# Patient Record
Sex: MALE | Race: WHITE
[De-identification: names, ages, dates, MRNs, and addresses within clinical notes are randomized per-mention and may not be internally consistent; named-entity substitution may affect disease eponyms.]

---

## 2018-05-23 ENCOUNTER — HOSPITAL ENCOUNTER (EMERGENCY)
Dept: HOSPITAL 11 - JP.ED | Age: 83
Discharge: HOME | End: 2018-05-23
Payer: MEDICARE

## 2018-05-23 DIAGNOSIS — Z79.899: ICD-10-CM

## 2018-05-23 DIAGNOSIS — I10: ICD-10-CM

## 2018-05-23 DIAGNOSIS — N13.2: Primary | ICD-10-CM

## 2018-05-23 NOTE — EDM.PDOC
ED HPI GENERAL MEDICAL PROBLEM





- General


Chief Complaint: Abdominal Pain


Stated Complaint: PAIN IN LEFT GROIN AREA


Time Seen by Provider: 05/23/18 19:05


Source of Information: Reports: Patient


History Limitations: Reports: No Limitations





- History of Present Illness


INITIAL COMMENTS - FREE TEXT/NARRATIVE: 





See Dr. Bhandari's History and Physical .


Patient not evaluated by this Provider


Improves with: Reports: None


Associated Symptoms: Reports: Diaphoresis, Nausea/Vomiting, Weakness


  ** Left Lower Abdomen


Pain Score (Numeric/FACES): 6





- Related Data


 Allergies











Allergy/AdvReac Type Severity Reaction Status Date / Time


 


No Known Allergies Allergy   Verified 05/23/18 19:14











Home Meds: 


 Home Meds





Levothyroxine 175 mcg PO ACBRK 05/23/18 [History]


Losartan [Cozaar] 50 mg PO DAILY 05/23/18 [History]


Metoprolol Succinate [Toprol XL] 25 mg PO DAILY 05/23/18 [History]











Past Medical History


HEENT History: Reports: Cataract, Hard of Hearing


Cardiovascular History: Reports: Hypertension


Respiratory History: Reports: Sleep Apnea


Musculoskeletal History: Reports: Back Pain, Chronic


Dermatologic History: Reports: Melanoma





- Infectious Disease History


Infectious Disease History: Reports: Chicken Pox, Measles





- Past Surgical History


HEENT Surgical History: Reports: Adenoidectomy, Cataract Surgery, Tonsillectomy

, Other (See Below)


Other HEENT Surgeries/Procedures: melanoma removed off left eye, melanoma 

removed on nose


GI Surgical History: Reports: Hernia, Inguinal


Musculoskeletal Surgical History: Reports: Knee Replacement





Social & Family History





- Family History


Family Medical History: Noncontributory





- Tobacco Use


Smoking Status *Q: Never Smoker





- Caffeine Use


Caffeine Use: Reports: Coffee





- Recreational Drug Use


Recreational Drug Use: No





ED ROS GENERAL





- Review of Systems


Review Of Systems: See Below


Constitutional: Reports: Other (See Dr. Bhandari H&P)





ED EXAM, GENERAL





- Physical Exam


Exam: See Below


Exam Limited By: Other (See Dr. Thony OLEARY)


Back Exam: Normal Inspection, Full Range of Motion, NT


Extremities: Normal Inspection, Normal Range of Motion, Non-Tender, No Pedal 

Edema, Normal Capillary Refill





Course





- Vital Signs


Last Recorded V/S: 


 Last Vital Signs











Temp  36.9 C   05/23/18 19:16


 


Pulse  98   05/23/18 19:16


 


Resp  16   05/23/18 19:16


 


BP  192/163 H  05/23/18 19:16


 


Pulse Ox  96   05/23/18 19:16














- Orders/Labs/Meds


Orders: 


 Active Orders 24 hr











 Category Date Time Status


 


 Abdomen Pelvis wo Cont [CT] Stat Exams  05/23/18 19:01 Taken











Labs: 


 Laboratory Tests











  05/23/18 05/23/18 Range/Units





  19:00 19:00 


 


WBC  8.4   (4.5-11.0)  K/uL


 


RBC  4.63   (4.30-5.90)  M/uL


 


Hgb  15.1 H   (12.0-15.0)  g/dL


 


Hct  42.8   (40.0-54.0)  %


 


MCV  92   (80-98)  fL


 


MCH  33 H   (27-31)  pg


 


MCHC  35   (32-36)  %


 


Plt Count  186   (150-400)  K/uL


 


Neut % (Auto)  72 H   (36-66)  %


 


Lymph % (Auto)  17 L   (24-44)  %


 


Mono % (Auto)  10 H   (2-6)  %


 


Eos % (Auto)  1 L   (2-4)  %


 


Baso % (Auto)  0   (0-1)  %


 


Sodium   144  (140-148)  mmol/L


 


Potassium   4.5  (3.6-5.2)  mmol/L


 


Chloride   107  (100-108)  mmol/L


 


Carbon Dioxide   28  (21-32)  mmol/L


 


Anion Gap   9.4  (5.0-14.0)  mmol/L


 


BUN   27 H  (7-18)  mg/dL


 


Creatinine   1.4 H  (0.8-1.3)  mg/dL


 


Est Cr Clr Drug Dosing   54.84  mL/min


 


Estimated GFR (MDRD)   49 L  (>60)  


 


Glucose   121 H  ()  mg/dL


 


Calcium   8.8  (8.5-10.1)  mg/dL


 


Total Bilirubin   0.5  (0.2-1.0)  mg/dL


 


AST   27  (15-37)  U/L


 


ALT   39  (12-78)  U/L


 


Alkaline Phosphatase   60  ()  U/L


 


Total Protein   7.6  (6.4-8.2)  g/dL


 


Albumin   3.9  (3.4-5.0)  g/dL


 


Globulin   3.7 H  (2.3-3.5)  g/dL


 


Albumin/Globulin Ratio   1.1 L  (1.2-2.2)  














Departure





- Departure


Time of Disposition: 20:59


Disposition: Home, Self-Care 01


Condition: Good


Clinical Impression: 


 Kidney stones








- Discharge Information


Instructions:  Kidney Stones, Easy-to-Read


Referrals: 


Sae Bhandari Sr, MD [Primary Care Provider] - 


Forms:  ED Department Discharge


Care Plan Goals: 


Kidney Stones


-drink plenty of water


-start Flomax 0.4mg po bid in morning


-follow up with Dr. Bhandari as planned.





- Problem List & Annotations


(1) Kidney stones


SNOMED Code(s): 12917079


   Code(s): N20.0 - CALCULUS OF KIDNEY   Status: Acute   Priority: High   

Current Visit: Yes   





- Problem List Review


Problem List Initiated/Reviewed/Updated: Yes





- Assessment/Plan


Plan: 


Kidney Stones


-drink plenty of water


-start Flomax 0.4mg po bid in morning


-follow up with Dr. Bhandari as planned.

## 2018-05-23 NOTE — PCM.HP
H&P History of Present Illness





- General


Date of Service: 05/23/18


Source of Information: Patient


History Limitations: Reports: No Limitations





- History of Present Illness


Initial Comments - Free Text/Narative: 





Sudden onset of abd. pain on the right side and never had it before.  Pain is 

coliky in nature.


Improves with: Reports: None


Associated Symptoms: Reports: Diaphoresis, Nausea/Vomiting, Weakness


  ** Left Lower Abdomen


Pain Score (Numeric/FACES): 6





- Related Data


Allergies/Adverse Reactions: 


 Allergies











Allergy/AdvReac Type Severity Reaction Status Date / Time


 


No Known Allergies Allergy   Verified 05/23/18 19:14











Home Medications: 


 Home Meds





Levothyroxine 175 mcg PO ACBRK 05/23/18 [History]


Losartan [Cozaar] 50 mg PO DAILY 05/23/18 [History]


Metoprolol Succinate [Toprol XL] 25 mg PO DAILY 05/23/18 [History]











Past Medical History


HEENT History: Reports: Cataract, Hard of Hearing


Cardiovascular History: Reports: Hypertension


Respiratory History: Reports: Sleep Apnea


Musculoskeletal History: Reports: Back Pain, Chronic


Dermatologic History: Reports: Melanoma





- Infectious Disease History


Infectious Disease History: Reports: Chicken Pox, Measles





- Past Surgical History


HEENT Surgical History: Reports: Adenoidectomy, Cataract Surgery, Tonsillectomy

, Other (See Below)


Other HEENT Surgeries/Procedures: melanoma removed off left eye, melanoma 

removed on nose


GI Surgical History: Reports: Hernia, Inguinal


Musculoskeletal Surgical History: Reports: Knee Replacement





Social & Family History





- Family History


Family Medical History: Noncontributory





- Tobacco Use


Smoking Status *Q: Never Smoker





- Caffeine Use


Caffeine Use: Reports: Coffee





- Recreational Drug Use


Recreational Drug Use: No





H&P Review of Systems





- Review of Systems:


Review Of Systems: See Below


General: Reports: Weakness


HEENT: Reports: No Symptoms


Pulmonary: Reports: No Symptoms


Cardiovascular: Reports: No Symptoms


Gastrointestinal: Reports: No Symptoms


Genitourinary: Reports: No Symptoms


Musculoskeletal: Reports: No Symptoms


Skin: Reports: No Symptoms


Psychiatric: Reports: No Symptoms


Neurological: Reports: No Symptoms





Exam





- Exam


Exam: See Below





- Vital Signs


Vital Signs: 


 Last Vital Signs











Temp  98.5 F   05/23/18 19:16


 


Pulse  98   05/23/18 19:16


 


Resp  16   05/23/18 19:16


 


BP  192/163 H  05/23/18 19:16


 


Pulse Ox  96   05/23/18 19:16











Weight: 270 lb





- Exam


General: Alert, Oriented, 4


HEENT: PERRLA, Hearing Intact, Mucosa Moist & Pink, Nares Patent, Normal Nasal 

Septum, Posterior Pharynx Clear, Conjunctiva Clear, EOMI, EACs Clear, TMs Clear


Neck: Supple, Trachea Midline, 2


Lungs: Clear to Auscultation, Normal Respiratory Effort


Cardiovascular: Regular Rate, Regular Rhythm


Back Exam: Normal Inspection, Full Range of Motion, NT


Extremities: Normal Inspection, Normal Range of Motion, Non-Tender, No Pedal 

Edema, Normal Capillary Refill


Skin: Warm, Dry, Intact





- Patient Data


Lab Results Last 24 hrs: 


 Laboratory Results - last 24 hr











  05/23/18 05/23/18 Range/Units





  19:00 19:00 


 


WBC  8.4   (4.5-11.0)  K/uL


 


RBC  4.63   (4.30-5.90)  M/uL


 


Hgb  15.1 H   (12.0-15.0)  g/dL


 


Hct  42.8   (40.0-54.0)  %


 


MCV  92   (80-98)  fL


 


MCH  33 H   (27-31)  pg


 


MCHC  35   (32-36)  %


 


Plt Count  186   (150-400)  K/uL


 


Neut % (Auto)  72 H   (36-66)  %


 


Lymph % (Auto)  17 L   (24-44)  %


 


Mono % (Auto)  10 H   (2-6)  %


 


Eos % (Auto)  1 L   (2-4)  %


 


Baso % (Auto)  0   (0-1)  %


 


Sodium   144  (140-148)  mmol/L


 


Potassium   4.5  (3.6-5.2)  mmol/L


 


Chloride   107  (100-108)  mmol/L


 


Carbon Dioxide   28  (21-32)  mmol/L


 


Anion Gap   9.4  (5.0-14.0)  mmol/L


 


BUN   27 H  (7-18)  mg/dL


 


Creatinine   1.4 H  (0.8-1.3)  mg/dL


 


Est Cr Clr Drug Dosing   54.84  mL/min


 


Estimated GFR (MDRD)   49 L  (>60)  


 


Glucose   121 H  ()  mg/dL


 


Calcium   8.8  (8.5-10.1)  mg/dL


 


Total Bilirubin   0.5  (0.2-1.0)  mg/dL


 


AST   27  (15-37)  U/L


 


ALT   39  (12-78)  U/L


 


Alkaline Phosphatase   60  ()  U/L


 


Total Protein   7.6  (6.4-8.2)  g/dL


 


Albumin   3.9  (3.4-5.0)  g/dL


 


Globulin   3.7 H  (2.3-3.5)  g/dL


 


Albumin/Globulin Ratio   1.1 L  (1.2-2.2)  











Result Diagrams: 


 05/23/18 19:00





 05/23/18 19:00


Problem List Initiated/Reviewed/Updated: Yes


Orders Last 24hrs: 


 Active Orders 24 hr











 Category Date Time Status


 


 Abdomen Pelvis wo Cont [CT] Stat Exams  05/23/18 19:01 Taken











Assessment/Plan Comment:: 





Assessment/Plan:  Kidney stone, Left.  He is to increase his water intake.  

Start taking Flomax bid.  If the stone doesn't pass he will see a Urologist.  

Will see in the office in 5 days.  He is to strain his urine.

## 2021-07-26 ENCOUNTER — HOSPITAL ENCOUNTER (EMERGENCY)
Dept: HOSPITAL 11 - JP.ED | Age: 86
Discharge: NURSING, REHAB OR LONG-TERM CARE | End: 2021-07-26
Payer: MEDICARE

## 2021-07-26 DIAGNOSIS — I63.9: Primary | ICD-10-CM

## 2021-07-26 DIAGNOSIS — Z79.899: ICD-10-CM

## 2021-07-26 DIAGNOSIS — I10: ICD-10-CM

## 2021-07-26 NOTE — CRLCT
For Patients:  As a result of the 21st Century Cures Act, medical imaging 

exams and procedure reports are released immediately into your electronic 

medical record.  You may view this report before your referring provider.  

If you have questions, please contact your health care provider.



INDICATION:



Right-sided weakness.



TECHNIQUE:



CT of the head without contrast. Coronal and sagittal reformats. Bone and 

soft tissue algorithms.



COMPARISON:



11/07/2016 CT head



FINDINGS:



No acute intracranial hemorrhage. No evidence of acute cortical infarction. 

Stable mild encephalomalacia in the left middle frontal gyrus and right 

superior frontal gyrus consistent with remote ischemic infarcts. Stable 

chronic lacunar infarcts in the left superior cerebellum. There is similar 

prominence of the extra-axial spaces along the frontal convexities 

suggestive of underlying subdural hygromas. This measures 1.2 cm on the 

left and 0.7 cm on the right side 



No mass effect or midline shift. Mild generalized cerebral/cerebellar 

parenchymal volume loss. Mild regions of decreased attenuation within the 

periventricular and subcortical white matter of both cerebral hemispheres 

most likely reflects chronic microvascular ischemic disease and age related 

change in this patient. Vascular calcifications within the carotid siphons. 

Orbital contents are normal.  No calvarial fractures. No lytic or sclerotic 

osseous lesions within the calvarium or skull base. Scalp and other imaged 

soft tissue structures are normal. Mastoid air cells are clear. 



Findings were discussed with Dr. Rees at 5:28 p.m. 



IMPRESSION:



1. No acute intracranial abnormality.



2. Stable mild encephalomalacia in the left middle frontal gyrus and right 

superior frontal gyrus consistent with remote ischemic infarcts. Stable 

chronic lacunar infarcts in the left superior cerebellum.



3. There is similar prominence of the extra-axial spaces along the frontal 

convexities suggestive of underlying subdural hygromas.



Please note that all CT scans at this facility use dose modulation, 

iterative reconstruction, and/or weight-based dosing when appropriate to 

reduce radiation dose to as low as reasonably achievable.



Dictated by Diego Mckinney MD @ 7/26/2021 5:28:32 PM



Signed by Dr. Diego Mckinney @ Jul 26 2021  5:28PM

## 2021-07-26 NOTE — EDM.PDOC
ED HPI GENERAL MEDICAL PROBLEM





- General


Stated Complaint: MEDICAL VIA NORTH


Time Seen by Provider: 07/26/21 16:45


Source of Information: Reports: Patient


History Limitations: Reports: No Limitations





- History of Present Illness


INITIAL COMMENTS - FREE TEXT/NARRATIVE: 





85-year-old male that started developing some right-sided weakness while at a 

local store about an hour and a half ago.  He noticed something was not right 

when he reached out and grabbed a carton of milk with his right hand and was not

able to pull it out.  He then felt off balanced, weak on his right side but was 

already in a scooter because he suffers from significant vertigo.  He has no 

headache, no visual problems, just some slight dysarthria but no expressive 

aphasia.  When he got to his car he tried to get up to get into the car and was 

having difficulty bearing weight on the right side and slumped to the ground.  

EMS was called.





- Related Data


                                    Allergies











Allergy/AdvReac Type Severity Reaction Status Date / Time


 


No Known Allergies Allergy   Verified 07/26/21 16:59











Home Meds: 


                                    Home Meds





Levothyroxine 175 mcg PO ACBRK 05/23/18 [History]


Metoprolol Succinate [Toprol XL] 25 mg PO DAILY 05/23/18 [History]


Warfarin [Coumadin] 5 mg PO ASDIRECTED 07/26/21 [History]











Past Medical History


HEENT History: Reports: Cataract, Hard of Hearing


Cardiovascular History: Reports: Hypertension


Respiratory History: Reports: Sleep Apnea


Musculoskeletal History: Reports: Back Pain, Chronic


Dermatologic History: Reports: Melanoma





- Infectious Disease History


Infectious Disease History: Reports: Chicken Pox, Measles





- Past Surgical History


HEENT Surgical History: Reports: Adenoidectomy, Cataract Surgery, Tonsillectomy,

 Other (See Below)


Other HEENT Surgeries/Procedures: melanoma removed off left eye, melanoma 

removed on nose


GI Surgical History: Reports: Hernia, Inguinal


Musculoskeletal Surgical History: Reports: Knee Replacement





Social & Family History





- Family History


Family Medical History: No Pertinent Family History





- Caffeine Use


Caffeine Use: Reports: Coffee





ED ROS GENERAL





- Review of Systems


Review Of Systems: See Below


Constitutional: Denies: Fever, Chills


HEENT: Reports: Vertigo (Chronic).  Denies: Rhinitis


Respiratory: Denies: Shortness of Breath


Cardiovascular: Denies: Chest Pain


GI/Abdominal: Denies: Nausea, Vomiting


: Reports: No Symptoms


Musculoskeletal: Reports: No Symptoms


Skin: Denies: Bruising


Neurological: Reports: Dizziness.  Denies: Headache


Psychiatric: Reports: No Symptoms





ED EXAM, GENERAL





- Physical Exam


Exam: See Below


Exam Limited By: No Limitations


General Appearance: Alert, No Apparent Distress


Eye Exam: Bilateral Eye: EOMI, Normal Inspection, PERRL


Head: Atraumatic


Neck: Supple, Non-Tender


Respiratory/Chest: Lungs Clear


Cardiovascular: Irregularly Irregular.  No: Tachycardia


GI/Abdominal: Soft, Non-Tender


Extremities: No: Pedal Edema


Neurological: Sensory/Motor Deficit (Right leg and right arm are weaker compared

 to the left, very slight facial droop on the right side.  Minimal sensory loss 

on the right side compared to the left.), Other (NIH stroke scale 9).  No: Maria Esther

ttentive, Confused


Psychiatric: Normal Affect, Normal Mood


Skin Exam: Warm, Dry





Course





- Vital Signs


Last Recorded V/S: 


                                Last Vital Signs











Temp  98.1 F   07/26/21 16:56


 


Pulse  82   07/26/21 17:29


 


Resp  16   07/26/21 17:29


 


BP  156/93 H  07/26/21 17:29


 


Pulse Ox  96   07/26/21 17:29














- Orders/Labs/Meds


Orders: 


                               Active Orders 24 hr











 Category Date Time Status


 


 EKG 12 Lead [EK] Routine Ther  07/26/21 16:53 Ordered











Labs: 


                                Laboratory Tests











  07/26/21 07/26/21 07/26/21 Range/Units





  16:45 16:45 16:45 


 


WBC  6.2    (4.5-11.0)  K/uL


 


RBC  4.78    (4.30-5.90)  M/uL


 


Hgb  15.8 H    (12.0-15.0)  g/dL


 


Hct  45.0    (40.0-54.0)  %


 


MCV  94    (80-98)  fL


 


MCH  33 H    (27-31)  pg


 


MCHC  35    (32-36)  %


 


Plt Count  182    (150-400)  K/uL


 


Neut % (Auto)  62.2    (36-66)  %


 


Lymph % (Auto)  25.4    (24-44)  %


 


Mono % (Auto)  10.0 H    (2-6)  %


 


Eos % (Auto)  2.1    (2-4)  %


 


Baso % (Auto)  0.3    (0-1)  %


 


PT   33.0 H   (9.5-12.0)  sec


 


INR   3.10 H   (0.80-1.20)  


 


Sodium    140  (140-148)  mmol/L


 


Potassium    4.4  (3.6-5.2)  mmol/L


 


Chloride    106  (100-108)  mmol/L


 


Carbon Dioxide    23  (21-32)  mmol/L


 


Anion Gap    10.8  (5.0-14.0)  mmol/L


 


BUN    20 H  (7-18)  mg/dL


 


Creatinine    1.0  (0.8-1.3)  mg/dL


 


Est Cr Clr Drug Dosing    72.88  mL/min


 


Estimated GFR (MDRD)    > 60  (>60)  


 


Glucose    104  ()  mg/dL


 


Calcium    8.7  (8.5-10.1)  mg/dL


 


Total Bilirubin    0.6  (0.2-1.0)  mg/dL


 


AST    24  (15-37)  U/L


 


ALT    29  (12-78)  U/L


 


Alkaline Phosphatase    60  ()  U/L


 


Total Protein    7.2  (6.4-8.2)  g/dL


 


Albumin    3.7  (3.4-5.0)  g/dL


 


Globulin    3.5  (2.3-3.5)  g/dL


 


Albumin/Globulin Ratio    1.1 L  (1.2-2.2)  














- Re-Assessments/Exams


Free Text/Narrative Re-Assessment/Exam: 





07/26/21 17:30


INR returned 3.1, CT report is as follows








IMPRESSION:


1. No acute intracranial abnormality.


2. Stable mild encephalomalacia in the left middle frontal gyrus and right s

uperior frontal gyrus consistent with remote ischemic infarcts. Stable chronic 

lacunar infarcts in the left superior cerebellum.


3. There is similar prominence of the extra-axial spaces along the frontal 

convexities suggestive of underlying subdural hygromas.





Findings were discussed with Dr. Mccormack patient symptoms remain stable, 

unimproved but not worse.  This was discussed with interventional neurologist 

on-call for [facility] in [place].  He accepted the patient to be transferred 

urgently and air care was called and are on their way.


07/26/21 17:34


Remaining labs are very reassuring, white count is normal, hemoglobin 15.8.  

Extended chemistry panel was normal other than a BUN of 20, again INR is 3.1.





Departure





- Departure


Time of Disposition: 17:59


Disposition: DC/Tfer to Acute Hospital 02


Clinical Impression: 


Cerebrovascular accident (CVA)


Qualifiers:


 CVA mechanism: unspecified Qualified Code(s): I63.9 - Cerebral infarction, 

unspecified








- Discharge Information


Referrals: 


PCP,None [Primary Care Provider] - 


Forms:  ED Department Discharge


Care Plan Goals: 


Patient transferred urgently to [facility] for interventional neurologist 

eval and treatment, accepted by Dr. Yi. Stable and unchanged on discharge





- My Orders


Last 24 Hours: 


My Active Orders





07/26/21 16:53


EKG 12 Lead [EK] Routine 














- Assessment/Plan


Last 24 Hours: 


My Active Orders





07/26/21 16:53


EKG 12 Lead [EK] Routine

## 2021-10-27 ENCOUNTER — HOSPITAL ENCOUNTER (EMERGENCY)
Dept: HOSPITAL 11 - JP.ED | Age: 86
Discharge: HOME | End: 2021-10-27
Payer: MEDICARE

## 2021-10-27 DIAGNOSIS — Z79.899: ICD-10-CM

## 2021-10-27 DIAGNOSIS — I63.9: Primary | ICD-10-CM

## 2021-10-27 DIAGNOSIS — Z86.73: ICD-10-CM

## 2021-10-27 DIAGNOSIS — I10: ICD-10-CM

## 2021-10-27 PROCEDURE — 84484 ASSAY OF TROPONIN QUANT: CPT

## 2021-10-27 PROCEDURE — 85730 THROMBOPLASTIN TIME PARTIAL: CPT

## 2021-10-27 PROCEDURE — 71045 X-RAY EXAM CHEST 1 VIEW: CPT

## 2021-10-27 PROCEDURE — 80053 COMPREHEN METABOLIC PANEL: CPT

## 2021-10-27 PROCEDURE — 70551 MRI BRAIN STEM W/O DYE: CPT

## 2021-10-27 PROCEDURE — 99284 EMERGENCY DEPT VISIT MOD MDM: CPT

## 2021-10-27 PROCEDURE — 85610 PROTHROMBIN TIME: CPT

## 2021-10-27 PROCEDURE — 36415 COLL VENOUS BLD VENIPUNCTURE: CPT

## 2021-10-27 PROCEDURE — 70450 CT HEAD/BRAIN W/O DYE: CPT

## 2021-10-27 PROCEDURE — 82550 ASSAY OF CK (CPK): CPT

## 2021-10-27 PROCEDURE — 70498 CT ANGIOGRAPHY NECK: CPT

## 2021-10-27 PROCEDURE — 85025 COMPLETE CBC W/AUTO DIFF WBC: CPT

## 2021-10-27 PROCEDURE — 93005 ELECTROCARDIOGRAM TRACING: CPT

## 2021-10-27 PROCEDURE — 70496 CT ANGIOGRAPHY HEAD: CPT

## 2021-10-27 RX ADMIN — Medication PRN ML: at 14:31

## 2021-10-27 RX ADMIN — Medication PRN ML: at 09:38

## 2021-10-27 NOTE — MR
Brain wo Cont

 

CLINICAL HISTORY:  History of CVA, new left sided weakness 

 

COMPARISON: Current CT brain

 

TECHNIQUE: Multiple axial, sagittal, and coronal images were obtained on a 1.5 T

magnet with multiweighted sequences, FLAIR, and diffusion imaging without

contrast. 

 

FINDINGS: There is no focal mass lesion. There is no hemmorhage or extraaxial

collection. There is cyst focus of restricted diffusion in the left basal

ganglia involving the posterior limb of the internal capsule. This also shows

increased signal on FLAIR images. There is a second ill-defined small focus of

restricted diffusion in the right the parietal subcortical region. This is also

bright on FLAIR images. This could represent some chronic ischemic microvascular

change. Subacute ischemic infarct is not excluded. There is moderate scattered

T2 hyperintensity in the subcortical and periventricular white matter in both

cerebral hemispheres. There is a focus of encephalomalacia in the left

cerebellar hemisphere related to old small ischemic infarct. The basal cisterns

and sulci over the convexities are prominent. The ventricles are mildly

prominent.  

 

IMPRESSION: Small focus of restricted diffusion and increased T2 signal in the

posterior limb of the left internal capsule is suspicious for subacute ischemic

infarct.

 

Was well defined focus of restricted diffusion and increased T2 signal is seen

in the right parietal subcortical white matter near the gray-white matter

junction. This may represent subacute ischemic infarct or possibly some chronic

ischemic microvascular change

 

Moderate atrophy with prominent CSF spaces over the frontal lobes. 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

## 2021-10-27 NOTE — CR
CHEST: Portable 10/27/2021 at 9:58 AM

 

CLINICAL HISTORY:Fall

 

COMPARISON:None

 

FINDINGS:  The heart size, pulmonary vascularity and hilar structures are

normal. No infiltrate effusion or pneumothorax is seen. There are

atherosclerotic changes in the aorta.

 

IMPRESSION: No acute cardiopulmonary process.

## 2021-10-27 NOTE — CT
Ang Head

 

CLINICAL HISTORY: CVA.

 

COMPARISON: None

 

TECHNIQUE: Multiple volume rendered and MIP 3D reconstructions were generated

from source images obtained on a spiral scanner before and after intravenous

iodinated contrast enhancement Auto dosage reduction and iterative

reconstruction techniques employed.

 

FINDINGS:

 

Internal carotid arteries: Show moderate hard plaque within the carotid siphons.

There are some areas of mild-to-moderate stenosis bilaterally. Calcifications

limit evaluation

 

Anterior cerebral arteries: Have a normal course and contour. The anterior

communicating artery is patent

 

Middle cerebral arteries: Normal course and contour

 

Posterior cerebral arteries: There is persistent fetal circulation on the left

with left posterior cerebral artery being serviced by the left carotid

distribution 

 

Vertebral/basilar arteries: The right vertebral artery is dominant. There is

some mild smooth narrowing of the distal basilar artery

 

IMPRESSION: Hard plaque in the carotid siphons bilaterally with some areas of

mild-to-moderate stenosis. Evaluation is limited by the hard plaque.

 

 

Persistent fetal circulation of the left

## 2021-10-27 NOTE — EDM.PDOC
ED HPI GENERAL MEDICAL PROBLEM





- General


Chief Complaint: Neuro Symptoms/Deficits


Stated Complaint: NO CONTROL OF LT SIDE OF FACE


Time Seen by Provider: 10/27/21 09:12


Source of Information: Reports: Patient, Old Records, RN Notes Reviewed


History Limitations: Reports: No Limitations





- History of Present Illness


INITIAL COMMENTS - FREE TEXT/NARRATIVE: 





86-year-old gentleman presents emergency department today complaint of left-

sided weakness, and left-sided facial droop.  He has a known history of 

cerebrovascular accident is on Coumadin cerebral accident accident was on the 

left side with a right-sided deficit mainly weakness.  He is about 6 months out 

from his prior accident.  For this particular event he states he has had left-

sided facial droop for the last couple of days.  He did fall at home yesterday 

afternoon he was outside he spent the night outside because he was too weak to 

get up estimate outside 5 to 6 hours.  He is complaining of left-sided weakness 

and has a new left-sided facial droop.





- Related Data


                                    Allergies











Allergy/AdvReac Type Severity Reaction Status Date / Time


 


No Known Allergies Allergy   Verified 10/27/21 09:00











Home Meds: 


                                    Home Meds





Levothyroxine 175 mcg PO ACBRK 05/23/18 [History]


Metoprolol Succinate [Toprol XL] 25 mg PO DAILY 05/23/18 [History]


Warfarin [Coumadin] 5 mg PO ASDIRECTED 07/26/21 [History]


atorvaSTATin [Lipitor] 40 mg PO DAILY 10/27/21 [History]











Past Medical History


HEENT History: Reports: Cataract, Hard of Hearing


Cardiovascular History: Reports: Hypertension


Respiratory History: Reports: Sleep Apnea


Musculoskeletal History: Reports: Back Pain, Chronic


Neurological History: Reports: CVA


Hematologic History: Reports: Anticoagulation Therapy


Dermatologic History: Reports: Melanoma





- Infectious Disease History


Infectious Disease History: Reports: Chicken Pox, Measles





- Past Surgical History


HEENT Surgical History: Reports: Adenoidectomy, Cataract Surgery, Tonsillectomy,

 Other (See Below)


Other HEENT Surgeries/Procedures: melanoma removed off left eye, melanoma 

removed on nose


GI Surgical History: Reports: Hernia, Inguinal


Neurological Surgical History: Reports: Other (See Below)


Other Neurological Surgeries/Procedures: back surgery


Musculoskeletal Surgical History: Reports: Knee Replacement





Social & Family History





- Family History


Family Medical History: No Pertinent Family History





- Tobacco Use


Tobacco Use Status *Q: Never Tobacco User





- Caffeine Use


Caffeine Use: Reports: Coffee





- Recreational Drug Use


Recreational Drug Use: No





ED ROS GENERAL





- Review of Systems


Review Of Systems: See Below


Constitutional: Reports: Weakness


HEENT: Reports: No Symptoms


Respiratory: Reports: No Symptoms


Cardiovascular: Reports: No Symptoms


GI/Abdominal: Reports: No Symptoms


Neurological: Reports: Trouble Speaking, Difficulty Walking, Weakness.  Denies: 

Headache





ED EXAM, NEURO





- Physical Exam


Exam: See Below


Text/Narrative:: 





Cranial nerves II test with pupillary light reflex 4 mm to 2 mm bilaterally, CN 

III test pupillary constriction, lid elevation and eye abduction bilaterally, CN

 IV downward movement of eyes bilaterally, CN V good jaw movement, CN VI lateral

 deviation of the eyes bilaterally to finger movement, CN VII asymmetrical smile

 with left-sided facial droop shows teeth with difficulty, CN VIII pass finger 

rub to ears bilaterally, CN IX adequate voice and tone, CN X adequate voice and 

tone no difficulty swallowing, CN XI can shrug shoulders without difficulty, CN 

XII can stick tongue out without difficulty, power is similar in upper 

extremities however when asked to raise arms to test pronator drift the left arm

 does not raise as high patient states this is difficult for him because his arm

 feels so weak he also has difficulty raising his left leg, NIH 8


Exam Limited By: No Limitations


General Appearance: Alert, No Apparent Distress


Eye Exam: Bilateral Eye: EOMI, Normal Inspection, PERRL


Throat/Mouth: Normal Inspection, Normal Lips, Normal Teeth, Normal Gums, Normal 

Oropharynx, Normal Voice, Other (Left-sided facial droop)


Head Exam: Atraumatic, Normocephalic


Neck: Normal Inspection, Supple, Non-Tender, Full Range of Motion


Respiratory/Chest: No Respiratory Distress, Lungs Clear, Normal Breath Sounds, 

No Accessory Muscle Use, Chest Non-Tender


Cardiovascular: Regular Rate, Rhythm, No Murmur


GI/Abdominal: Normal Bowel Sounds, Soft, Non-Tender


  ** #1 Interpretation


EKG Date: 10/27/21


Time: 09:34


Rhythm: NSR


Axis: Normal


P-Wave: Present


QRS: Normal


ST-T: Normal


QT: Normal


Comparison: No Change





Course





- Vital Signs


Last Recorded V/S: 


                                Last Vital Signs











Temp  97.7 F   10/27/21 15:18


 


Pulse  66   10/27/21 15:18


 


Resp  16   10/27/21 15:18


 


BP  151/94 H  10/27/21 15:18


 


Pulse Ox  98   10/27/21 15:18














- Orders/Labs/Meds


Orders: 


                               Active Orders 24 hr











 Category Date Time Status


 


 Blood Glucose Check, Bedside [RC] STAT Care  10/27/21 09:21 Active


 


 Cardiac Monitoring [RC] CONTINUOUS Care  10/27/21 09:21 Active


 


 Communication Order [RC] STAT Care  10/27/21 09:21 Active


 


 Height and Weight [RC] UPON Care  10/27/21 09:21 Active


 


 NIH Stroke Scale [RC] STAT Care  10/27/21 09:21 Active


 


 Peripheral IV Care [RC] .AS DIRECTED Care  10/27/21 09:24 Active


 


 Iopamidol [Isovue-370 (76%)] Med  10/27/21 13:30 Active





 100 ml IV .AS DIRECTED   


 


 Sodium Chloride 0.9% [Normal Saline] 100 ml Med  10/27/21 13:30 Active





 IV ASDIRECTED   


 


 Sodium Chloride 0.9% [Saline Flush] Med  10/27/21 09:23 Active





 10 ml FLUSH ASDIRECTED PRN   


 


 Peripheral IV Insertion Adult [OM.PC] Urgent Oth  10/27/21 09:23 Ordered


 


 EKG 12 Lead [EK] Stat Ther  10/27/21 09:21 Ordered








                                Medication Orders





Sodium Chloride (Normal Saline)  100 mls @ 3 mls/sec IV ASDIRECTED ZAC


   Stop: 10/27/21 16:00


   Last Admin: 10/27/21 14:31  Dose: 4 mls/sec


   Documented by: MECHELLE


Iopamidol (Iopamidol 755 Mg/Ml 100 Ml Bottle)  100 ml IV .AS DIRECTED ZAC


   Last Admin: 10/27/21 14:31  Dose: 100 ml


   Documented by: MECHELLE


Sodium Chloride (Sodium Chloride 0.9% 10 Ml Syringe)  10 ml FLUSH ASDIRECTED PRN


   PRN Reason: Keep Vein Open


   Last Admin: 10/27/21 14:31  Dose: 10 ml


   Documented by: MECHELLE


   Admin: 10/27/21 09:38  Dose: 10 ml


   Documented by: ISSAC








Labs: 


                                Laboratory Tests











  10/27/21 10/27/21 10/27/21 Range/Units





  09:39 09:39 09:39 


 


WBC  5.9    (4.5-11.0)  K/uL


 


RBC  4.55    (4.30-5.90)  M/uL


 


Hgb  14.5    (12.0-15.0)  g/dL


 


Hct  42.4    (40.0-54.0)  %


 


MCV  93    (80-98)  fL


 


MCH  32 H    (27-31)  pg


 


MCHC  34    (32-36)  %


 


Plt Count  174    (150-400)  K/uL


 


Neut % (Auto)  68.8 H    (36-66)  %


 


Lymph % (Auto)  16.4 L    (24-44)  %


 


Mono % (Auto)  12.8 H    (2-6)  %


 


Eos % (Auto)  1.5 L    (2-4)  %


 


Baso % (Auto)  0.5    (0-1)  %


 


PT   22.3 H   (9.2-10.6)  sec


 


INR   2.2   


 


APTT   32.1 H   (21.4-31.8)  sec


 


Sodium    137 L  (140-148)  mmol/L


 


Potassium    3.8  (3.6-5.2)  mmol/L


 


Chloride    104  (100-108)  mmol/L


 


Carbon Dioxide    25  (21-32)  mmol/L


 


Anion Gap    11.8  (5.0-14.0)  mmol/L


 


BUN    19 H  (7-18)  mg/dL


 


Creatinine    0.9  (0.8-1.3)  mg/dL


 


Est Cr Clr Drug Dosing    79.53  mL/min


 


Estimated GFR (MDRD)    > 60  (>60)  


 


Glucose    108 H  ()  mg/dL


 


Calcium    8.9  (8.5-10.1)  mg/dL


 


Total Bilirubin    0.6  (0.2-1.0)  mg/dL


 


AST    25  (15-37)  U/L


 


ALT    36  (12-78)  U/L


 


Alkaline Phosphatase    61  ()  U/L


 


Creatine Kinase     ()  U/L


 


Troponin I    0.039  (0.000-0.056)  ng/mL


 


Total Protein    6.4  (6.4-8.2)  g/dL


 


Albumin    3.3 L  (3.4-5.0)  g/dL


 


Globulin    3.1  (2.3-3.5)  g/dL


 


Albumin/Globulin Ratio    1.1 L  (1.2-2.2)  














  10/27/21 Range/Units





  09:39 


 


WBC   (4.5-11.0)  K/uL


 


RBC   (4.30-5.90)  M/uL


 


Hgb   (12.0-15.0)  g/dL


 


Hct   (40.0-54.0)  %


 


MCV   (80-98)  fL


 


MCH   (27-31)  pg


 


MCHC   (32-36)  %


 


Plt Count   (150-400)  K/uL


 


Neut % (Auto)   (36-66)  %


 


Lymph % (Auto)   (24-44)  %


 


Mono % (Auto)   (2-6)  %


 


Eos % (Auto)   (2-4)  %


 


Baso % (Auto)   (0-1)  %


 


PT   (9.2-10.6)  sec


 


INR   


 


APTT   (21.4-31.8)  sec


 


Sodium   (140-148)  mmol/L


 


Potassium   (3.6-5.2)  mmol/L


 


Chloride   (100-108)  mmol/L


 


Carbon Dioxide   (21-32)  mmol/L


 


Anion Gap   (5.0-14.0)  mmol/L


 


BUN   (7-18)  mg/dL


 


Creatinine   (0.8-1.3)  mg/dL


 


Est Cr Clr Drug Dosing   mL/min


 


Estimated GFR (MDRD)   (>60)  


 


Glucose   ()  mg/dL


 


Calcium   (8.5-10.1)  mg/dL


 


Total Bilirubin   (0.2-1.0)  mg/dL


 


AST   (15-37)  U/L


 


ALT   (12-78)  U/L


 


Alkaline Phosphatase   ()  U/L


 


Creatine Kinase  112  ()  U/L


 


Troponin I   (0.000-0.056)  ng/mL


 


Total Protein   (6.4-8.2)  g/dL


 


Albumin   (3.4-5.0)  g/dL


 


Globulin   (2.3-3.5)  g/dL


 


Albumin/Globulin Ratio   (1.2-2.2)  











Meds: 


Medications











Generic Name Dose Route Start Last Admin





  Trade Name Freq  PRN Reason Stop Dose Admin


 


Sodium Chloride  100 mls @ 3 mls/sec  10/27/21 13:30  10/27/21 14:31





  Normal Saline  IV  10/27/21 16:00  4 mls/sec





  ASDIRECTED ZAC   Administration


 


Iopamidol  100 ml  10/27/21 13:30  10/27/21 14:31





  Iopamidol 755 Mg/Ml 100 Ml Bottle  IV   100 ml





  .AS DIRECTED ZAC   Administration


 


Sodium Chloride  10 ml  10/27/21 09:23  10/27/21 14:31





  Sodium Chloride 0.9% 10 Ml Syringe  FLUSH   10 ml





  ASDIRECTED PRN   Administration





  Keep Vein Open  














Discontinued Medications














Generic Name Dose Route Start Last Admin





  Trade Name Morgan  PRN Reason Stop Dose Admin


 


Sodium Chloride  10 ml  10/27/21 13:20 





  Sodium Chloride 0.9% 10 Ml Sdv  FLUSH  10/27/21 13:21 





  ONETIME ONE  














- Re-Assessments/Exams


Free Text/Narrative Re-Assessment/Exam: 





Called and discussed the case with Dr. Del Castillo interventional neurologist [facility] at 1310 recommended continue to work-up CTA head and neck continue

 with Coumadin and an aggressive cholesterol and blood pressure management 

however there were no other interventions at this time because of the duration 

of the stroke symptoms





Departure





- Departure


Time of Disposition: 15:46


Disposition: Home, Self-Care 01


Condition: Fair


Clinical Impression: 


Cerebrovascular accident (CVA)


Qualifiers:


 CVA mechanism: unspecified Qualified Code(s): I63.9 - Cerebral infarction, 

unspecified








- Discharge Information


Instructions:  Rehabilitation After a Stroke, Adult


Referrals: 


Sae Bhandari Sr, MD [Primary Care Provider] - 


Forms:  ED Department Discharge


Additional Instructions: 


Continue with your regular medications  please followup with your primary care 

provider in 3-5 days if not better, please call return to the emergency 

department with worsening of symptoms.,





Sepsis Event Note (ED)





- Evaluation


Sepsis Screening Result: No Definite Risk





- Focused Exam


Vital Signs: 


                                   Vital Signs











  Temp Pulse Resp BP Pulse Ox


 


 10/27/21 15:18  97.7 F  66  16  151/94 H  98


 


 10/27/21 11:46   63  20  101/83  97


 


 10/27/21 10:50   50 L   131/83 


 


 10/27/21 10:20   60   115/79 


 


 10/27/21 09:21   62  18  130/87 


 


 10/27/21 09:00  98.5 F  77  15  143/95 H  94 L


 


 10/27/21 08:58  98.5 F  77  15  143/95 H  94 L














- My Orders


Last 24 Hours: 


My Active Orders





10/27/21 09:21


Blood Glucose Check, Bedside [RC] STAT 


Cardiac Monitoring [RC] CONTINUOUS 


Communication Order [RC] STAT 


Height and Weight [RC] UPON 


NIH Stroke Scale [RC] STAT 


EKG 12 Lead [EK] Stat 





10/27/21 09:23


Sodium Chloride 0.9% [Saline Flush]   10 ml FLUSH ASDIRECTED PRN 


Peripheral IV Insertion Adult [OM.PC] Urgent 





10/27/21 09:24


Peripheral IV Care [RC] .AS DIRECTED 





10/27/21 13:30


Iopamidol [Isovue-370 (76%)]   100 ml IV .AS DIRECTED 


Sodium Chloride 0.9% [Normal Saline] 100 ml IV ASDIRECTED 














- Assessment/Plan


Last 24 Hours: 


My Active Orders





10/27/21 09:21


Blood Glucose Check, Bedside [RC] STAT 


Cardiac Monitoring [RC] CONTINUOUS 


Communication Order [RC] STAT 


Height and Weight [RC] UPON 


NIH Stroke Scale [RC] STAT 


EKG 12 Lead [EK] Stat 





10/27/21 09:23


Sodium Chloride 0.9% [Saline Flush]   10 ml FLUSH ASDIRECTED PRN 


Peripheral IV Insertion Adult [OM.PC] Urgent 





10/27/21 09:24


Peripheral IV Care [RC] .AS DIRECTED 





10/27/21 13:30


Iopamidol [Isovue-370 (76%)]   100 ml IV .AS DIRECTED 


Sodium Chloride 0.9% [Normal Saline] 100 ml IV ASDIRECTED 











Plan: 





Assessment





Acuity = subacute





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





Etiology  = unknown





Manifestations = left-sided facial droop left-sided weakness





Location of injury =  Home





Lab values = CBC CMP unremarkable EKG demonstrates sinus rhythm CT scan of the 

head showed no acute process MRI does describe the internal capsule infarction 

CTA head and neck revealed mild to moderate stenosis





Plan


Initially called and discussed the case with [facility] 

interventional neurologist recommended aggressive control of blood pressure 

cholesterol echo as an outpatient carotid studies while in the emergency 

department.  Call discussed case with his primary care Dr. Bhandari at 1530 he 

will follow-up with him in clinic.

















 This note was dictated using dragon voice recognition software please call with

any questions on syntax or grammar.

## 2021-10-27 NOTE — CT
Head wo Cont

 

CLINICAL HISTORY: History of CVA, new left facial droop 

 

COMPARISON: 7/26/2021

 

TECHNIQUE: Transverse scans were obtained from the base of the skull through the

vertex without IV contrast on a multislice, multidetector CT scanner. Auto

dosage reduction and iterative reconstruction techniques employed.

 

FINDINGS: There is a focus of encephalomalacia in the left frontal lobe. This

was seen on prior CTs. There is no mass effect, hemorrhage, or extraaxial

collection. There are scattered subcortical and periventricular lucency similar

to prior study. The basal cisterns and sulci over the convexities are prominent.

The ventricles are normal for age.

 

IMPRESSION: Previous ischemic infarct left frontal lobe similar to prior study

 

Moderate chronic ischemic microvascular change

 

Moderate generalized atrophic change. There is some prominence over the frontal

gyri. This has diminished slightly since prior study and may represent some

involution of bilateral hygromas. No acute hemorrhage

## 2021-10-27 NOTE — CT
Ang Neck

 

CLINICAL HISTORY: CVA

 

TECHNIQUE: Multiple axial images were obtained through the neck with the IV

infusion of iodinated contrast. From these images sagittal and coronal

reconstructions of the aortic arch and carotids were obtained. NASCET criteria

is used. Auto dosage reduction and iterative reconstruction technique employed.

 

FINDINGS: The aortic arch and proximal brachycephalic vessels are tortuous.

There is some mild to plaque at the origin of the left the common carotid

artery. There is hard plaque at the origin of the left subclavian artery. Both

vertebral arteries are patent. The right is dominant

There is hard plaque in the right carotid bifurcation. There is minimal

stenosis. There is some minimal hard plaque in the left carotid bifurcation.

There is no significant stenosis.

 

 

IMPRESSION: Mild atheromatous changes diffusely

 

Mild hard plaque in both carotid bifurcations with some minimal stenosis in the

right ICA origin Awake

## 2022-07-15 ENCOUNTER — HOSPITAL ENCOUNTER (INPATIENT)
Dept: HOSPITAL 11 - JP.ED | Age: 87
LOS: 3 days | Discharge: HOME | DRG: 177 | End: 2022-07-18
Attending: INTERNAL MEDICINE | Admitting: INTERNAL MEDICINE
Payer: MEDICARE

## 2022-07-15 DIAGNOSIS — G89.29: ICD-10-CM

## 2022-07-15 DIAGNOSIS — E03.9: ICD-10-CM

## 2022-07-15 DIAGNOSIS — Z79.01: ICD-10-CM

## 2022-07-15 DIAGNOSIS — H91.90: ICD-10-CM

## 2022-07-15 DIAGNOSIS — Z79.890: ICD-10-CM

## 2022-07-15 DIAGNOSIS — R63.0: ICD-10-CM

## 2022-07-15 DIAGNOSIS — Z79.899: ICD-10-CM

## 2022-07-15 DIAGNOSIS — Z98.49: ICD-10-CM

## 2022-07-15 DIAGNOSIS — E78.5: ICD-10-CM

## 2022-07-15 DIAGNOSIS — Z86.73: ICD-10-CM

## 2022-07-15 DIAGNOSIS — Z86.19: ICD-10-CM

## 2022-07-15 DIAGNOSIS — J12.82: ICD-10-CM

## 2022-07-15 DIAGNOSIS — R19.7: ICD-10-CM

## 2022-07-15 DIAGNOSIS — Z90.89: ICD-10-CM

## 2022-07-15 DIAGNOSIS — Z85.820: ICD-10-CM

## 2022-07-15 DIAGNOSIS — M54.9: ICD-10-CM

## 2022-07-15 DIAGNOSIS — U07.1: Primary | ICD-10-CM

## 2022-07-15 DIAGNOSIS — Z96.659: ICD-10-CM

## 2022-07-15 DIAGNOSIS — I49.3: ICD-10-CM

## 2022-07-15 DIAGNOSIS — I10: ICD-10-CM

## 2022-07-15 DIAGNOSIS — M48.061: ICD-10-CM

## 2022-07-15 DIAGNOSIS — G47.30: ICD-10-CM

## 2022-07-15 DIAGNOSIS — R53.1: ICD-10-CM

## 2022-07-15 DIAGNOSIS — I48.91: ICD-10-CM

## 2022-07-15 DIAGNOSIS — M54.16: ICD-10-CM

## 2022-07-15 PROCEDURE — U0002 COVID-19 LAB TEST NON-CDC: HCPCS

## 2022-07-15 PROCEDURE — 71045 X-RAY EXAM CHEST 1 VIEW: CPT

## 2022-07-15 PROCEDURE — 36415 COLL VENOUS BLD VENIPUNCTURE: CPT

## 2022-07-15 PROCEDURE — 80053 COMPREHEN METABOLIC PANEL: CPT

## 2022-07-15 PROCEDURE — 85610 PROTHROMBIN TIME: CPT

## 2022-07-15 PROCEDURE — C9113 INJ PANTOPRAZOLE SODIUM, VIA: HCPCS

## 2022-07-15 PROCEDURE — 86140 C-REACTIVE PROTEIN: CPT

## 2022-07-15 PROCEDURE — 3E0333Z INTRODUCTION OF ANTI-INFLAMMATORY INTO PERIPHERAL VEIN, PERCUTANEOUS APPROACH: ICD-10-PCS | Performed by: INTERNAL MEDICINE

## 2022-07-15 PROCEDURE — 99285 EMERGENCY DEPT VISIT HI MDM: CPT

## 2022-07-15 PROCEDURE — 83735 ASSAY OF MAGNESIUM: CPT

## 2022-07-15 PROCEDURE — 99283 EMERGENCY DEPT VISIT LOW MDM: CPT

## 2022-07-15 PROCEDURE — XW033E5 INTRODUCTION OF REMDESIVIR ANTI-INFECTIVE INTO PERIPHERAL VEIN, PERCUTANEOUS APPROACH, NEW TECHNOLOGY GROUP 5: ICD-10-PCS | Performed by: INTERNAL MEDICINE

## 2022-07-15 PROCEDURE — 8E0ZXY6 ISOLATION: ICD-10-PCS | Performed by: INTERNAL MEDICINE

## 2022-07-15 PROCEDURE — 85025 COMPLETE CBC W/AUTO DIFF WBC: CPT

## 2022-07-15 RX ADMIN — DEXAMETHASONE SODIUM PHOSPHATE SCH MG: 4 INJECTION, SOLUTION INTRAMUSCULAR; INTRAVENOUS at 22:57

## 2022-07-16 RX ADMIN — DEXAMETHASONE SODIUM PHOSPHATE SCH MG: 4 INJECTION, SOLUTION INTRAMUSCULAR; INTRAVENOUS at 12:13

## 2022-07-17 RX ADMIN — DEXAMETHASONE SODIUM PHOSPHATE SCH MG: 4 INJECTION, SOLUTION INTRAMUSCULAR; INTRAVENOUS at 08:41

## 2022-07-17 RX ADMIN — MAGNESIUM HYDROXIDE PRN ML: 400 SUSPENSION ORAL at 11:15

## 2022-07-18 RX ADMIN — DEXAMETHASONE SODIUM PHOSPHATE SCH MG: 4 INJECTION, SOLUTION INTRAMUSCULAR; INTRAVENOUS at 08:08

## 2022-07-18 RX ADMIN — MAGNESIUM HYDROXIDE PRN ML: 400 SUSPENSION ORAL at 09:59

## 2023-06-06 ENCOUNTER — HOSPITAL ENCOUNTER (EMERGENCY)
Dept: HOSPITAL 11 - JP.ED | Age: 88
Discharge: NURSING, REHAB OR LONG-TERM CARE | End: 2023-06-06
Payer: MEDICARE

## 2023-06-06 DIAGNOSIS — Z79.899: ICD-10-CM

## 2023-06-06 DIAGNOSIS — I63.9: Primary | ICD-10-CM

## 2023-06-06 DIAGNOSIS — I10: ICD-10-CM

## 2023-06-06 LAB
ALBUMIN SERPL-MCNC: 3.6 G/DL (ref 3.4–5)
ALBUMIN/GLOB SERPL: 1 {RATIO} (ref 1.2–2.2)
ALP SERPL-CCNC: 83 U/L (ref 46–116)
ALT SERPL-CCNC: 31 U/L (ref 12–78)
ANION GAP SERPL CALC-SCNC: 6 MMOL/L (ref 5–14)
APTT PPP: 28.1 SEC (ref 21.8–27.3)
AST SERPL-CCNC: 26 U/L (ref 15–37)
BASOPHILS # BLD AUTO: 0.06 K/UL (ref 0–0.1)
BASOPHILS NFR BLD AUTO: 1 % (ref 0.1–1.3)
BILIRUB SERPL-MCNC: 0.9 MG/DL (ref 0.2–1)
BUN SERPL-MCNC: 27 MG/DL (ref 7–18)
CALCIUM SERPL-MCNC: 9.4 MG/DL (ref 8.5–10.1)
CHLORIDE SERPL-SCNC: 105 MMOL/L (ref 100–108)
CO2 SERPL-SCNC: 29 MMOL/L (ref 21–32)
CREAT CL 24H UR+SERPL-VRATE: 63.89 ML/MIN
CREAT SERPL-MCNC: 1.1 MG/DL (ref 0.8–1.3)
EOSINOPHIL # BLD AUTO: 0.2 K/UL (ref 0–0.4)
EOSINOPHIL NFR BLD AUTO: 3.2 % (ref 0–5.4)
GLOBULIN SER-MCNC: 3.5 G/DL (ref 2.3–3.5)
GLUCOSE SERPL-MCNC: 102 MG/DL (ref 74–106)
HCT VFR BLD AUTO: 43.5 % (ref 38.4–49.7)
HGB BLD-MCNC: 15.1 G/DL (ref 12.9–16.9)
IMM GRANULOCYTES # BLD: 0.02 K/UL (ref 0–0.23)
IMM GRANULOCYTES NFR BLD: 0.3 % (ref 0–0.7)
INR PPP: 1.6
LYMPHOCYTES # BLD AUTO: 1.64 K/UL (ref 0.8–3.3)
LYMPHOCYTES NFR BLD AUTO: 26.5 % (ref 11.4–47.7)
MCH RBC QN AUTO: 32.3 PG (ref 31.6–35.5)
MCHC RBC AUTO-ENTMCNC: 34.7 G/DL (ref 31.6–35.5)
MCHC RBC AUTO-ENTMCNC: 93.1 FL (ref 81.4–99)
MONOCYTES # BLD AUTO: 0.64 K/UL (ref 0.2–0.9)
MONOCYTES NFR BLD AUTO: 10.3 % (ref 3.3–12.6)
NEUTROPHILS # BLD AUTO: 3.63 K/UL (ref 1–7.6)
NEUTROPHILS NFR BLD AUTO: 58.7 % (ref 40–78.1)
PLATELET # BLD AUTO: 185 K/UL (ref 130–375)
POTASSIUM SERPL-SCNC: 4 MMOL/L (ref 3.6–5.2)
PROT SERPL-MCNC: 7.1 G/DL (ref 6.4–8.2)
PROTHROMBIN TIME: 15.4 SEC (ref 9.2–10.6)
RBC # BLD AUTO: 4.67 M/UL (ref 4.14–5.76)
SODIUM SERPL-SCNC: 140 MMOL/L (ref 140–148)
TROPONIN I SERPL HS-MCNC: 80.2 PG/ML (ref ?–60.3)
WBC # BLD AUTO: 6.2 K/UL (ref 3.2–11)

## 2023-06-06 PROCEDURE — 93005 ELECTROCARDIOGRAM TRACING: CPT

## 2023-06-06 PROCEDURE — 36415 COLL VENOUS BLD VENIPUNCTURE: CPT

## 2023-06-06 PROCEDURE — 85025 COMPLETE CBC W/AUTO DIFF WBC: CPT

## 2023-06-06 PROCEDURE — 70450 CT HEAD/BRAIN W/O DYE: CPT

## 2023-06-06 PROCEDURE — 82947 ASSAY GLUCOSE BLOOD QUANT: CPT

## 2023-06-06 PROCEDURE — 85610 PROTHROMBIN TIME: CPT

## 2023-06-06 PROCEDURE — 85730 THROMBOPLASTIN TIME PARTIAL: CPT

## 2023-06-06 PROCEDURE — 99285 EMERGENCY DEPT VISIT HI MDM: CPT

## 2023-06-06 PROCEDURE — U0002 COVID-19 LAB TEST NON-CDC: HCPCS

## 2023-06-06 PROCEDURE — 84484 ASSAY OF TROPONIN QUANT: CPT

## 2023-06-06 PROCEDURE — 80053 COMPREHEN METABOLIC PANEL: CPT

## 2023-08-02 ENCOUNTER — HOSPITAL ENCOUNTER (INPATIENT)
Dept: HOSPITAL 11 - JP.SDS | Age: 88
LOS: 5 days | Discharge: NURSING, REHAB OR LONG-TERM CARE | DRG: 470 | End: 2023-08-07
Attending: PHYSICIAN ASSISTANT | Admitting: ORTHOPAEDIC SURGERY
Payer: MEDICARE

## 2023-08-02 DIAGNOSIS — I10: ICD-10-CM

## 2023-08-02 DIAGNOSIS — Z85.828: ICD-10-CM

## 2023-08-02 DIAGNOSIS — Z79.899: ICD-10-CM

## 2023-08-02 DIAGNOSIS — I95.9: ICD-10-CM

## 2023-08-02 DIAGNOSIS — Z86.73: ICD-10-CM

## 2023-08-02 DIAGNOSIS — I48.91: ICD-10-CM

## 2023-08-02 DIAGNOSIS — G89.29: ICD-10-CM

## 2023-08-02 DIAGNOSIS — E03.9: ICD-10-CM

## 2023-08-02 DIAGNOSIS — Z98.890: ICD-10-CM

## 2023-08-02 DIAGNOSIS — G47.33: ICD-10-CM

## 2023-08-02 DIAGNOSIS — Z79.82: ICD-10-CM

## 2023-08-02 DIAGNOSIS — Z79.01: ICD-10-CM

## 2023-08-02 DIAGNOSIS — M54.50: ICD-10-CM

## 2023-08-02 DIAGNOSIS — M17.11: Primary | ICD-10-CM

## 2023-08-02 DIAGNOSIS — E78.5: ICD-10-CM

## 2023-08-02 DIAGNOSIS — Z96.652: ICD-10-CM

## 2023-08-02 LAB
INR PPP: 1.3
PROTHROMBIN TIME: 13 SEC (ref 9.2–10.6)

## 2023-08-02 PROCEDURE — 97162 PT EVAL MOD COMPLEX 30 MIN: CPT

## 2023-08-02 PROCEDURE — 85610 PROTHROMBIN TIME: CPT

## 2023-08-02 PROCEDURE — 73560 X-RAY EXAM OF KNEE 1 OR 2: CPT

## 2023-08-02 PROCEDURE — C1713 ANCHOR/SCREW BN/BN,TIS/BN: HCPCS

## 2023-08-02 PROCEDURE — 27447 TOTAL KNEE ARTHROPLASTY: CPT

## 2023-08-02 PROCEDURE — 97110 THERAPEUTIC EXERCISES: CPT

## 2023-08-02 PROCEDURE — C1776 JOINT DEVICE (IMPLANTABLE): HCPCS

## 2023-08-02 PROCEDURE — 36415 COLL VENOUS BLD VENIPUNCTURE: CPT

## 2023-08-02 PROCEDURE — 0SRC0J9 REPLACEMENT OF RIGHT KNEE JOINT WITH SYNTHETIC SUBSTITUTE, CEMENTED, OPEN APPROACH: ICD-10-PCS | Performed by: ORTHOPAEDIC SURGERY

## 2023-08-02 RX ADMIN — Medication SCH SWAB: at 20:37

## 2023-08-02 RX ADMIN — Medication SCH SWAB: at 07:38

## 2023-08-02 RX ADMIN — Medication SCH: at 11:13

## 2023-08-03 LAB
INR PPP: 1.5
PROTHROMBIN TIME: 14.8 SEC (ref 9.2–10.6)

## 2023-08-03 RX ADMIN — Medication SCH SWAB: at 21:11

## 2023-08-03 RX ADMIN — Medication SCH SWAB: at 08:25

## 2023-08-04 LAB
INR PPP: 1.8
PROTHROMBIN TIME: 17.4 SEC (ref 9.2–10.6)

## 2023-08-04 RX ADMIN — Medication SCH SWAB: at 08:00

## 2023-08-04 RX ADMIN — Medication SCH SWAB: at 21:21

## 2023-08-05 RX ADMIN — Medication SCH SWAB: at 21:00

## 2023-08-05 RX ADMIN — Medication SCH SWAB: at 08:22

## 2023-08-06 RX ADMIN — Medication SCH SWAB: at 21:02

## 2023-08-06 RX ADMIN — Medication SCH SWAB: at 08:12

## 2023-08-07 LAB
INR PPP: 1.5
PROTHROMBIN TIME: 14.9 SEC (ref 9.2–10.6)

## 2023-08-07 RX ADMIN — Medication SCH SWAB: at 08:08

## 2023-08-24 ENCOUNTER — HOSPITAL ENCOUNTER (INPATIENT)
Dept: HOSPITAL 11 - JP.ED | Age: 88
LOS: 6 days | Discharge: NURSING, REHAB OR LONG-TERM CARE | DRG: 418 | End: 2023-08-30
Attending: INTERNAL MEDICINE | Admitting: INTERNAL MEDICINE
Payer: MEDICARE

## 2023-08-24 DIAGNOSIS — Z98.49: ICD-10-CM

## 2023-08-24 DIAGNOSIS — K82.8: ICD-10-CM

## 2023-08-24 DIAGNOSIS — K81.0: Primary | ICD-10-CM

## 2023-08-24 DIAGNOSIS — G89.29: ICD-10-CM

## 2023-08-24 DIAGNOSIS — E03.9: ICD-10-CM

## 2023-08-24 DIAGNOSIS — M54.9: ICD-10-CM

## 2023-08-24 DIAGNOSIS — N30.00: ICD-10-CM

## 2023-08-24 DIAGNOSIS — Z87.891: ICD-10-CM

## 2023-08-24 DIAGNOSIS — Z86.16: ICD-10-CM

## 2023-08-24 DIAGNOSIS — I48.91: ICD-10-CM

## 2023-08-24 DIAGNOSIS — Z86.73: ICD-10-CM

## 2023-08-24 DIAGNOSIS — D72.829: ICD-10-CM

## 2023-08-24 DIAGNOSIS — Z96.651: ICD-10-CM

## 2023-08-24 DIAGNOSIS — Z79.82: ICD-10-CM

## 2023-08-24 DIAGNOSIS — Z79.01: ICD-10-CM

## 2023-08-24 DIAGNOSIS — G47.30: ICD-10-CM

## 2023-08-24 DIAGNOSIS — Z90.89: ICD-10-CM

## 2023-08-24 DIAGNOSIS — R79.1: ICD-10-CM

## 2023-08-24 DIAGNOSIS — K42.9: ICD-10-CM

## 2023-08-24 DIAGNOSIS — Z79.899: ICD-10-CM

## 2023-08-24 DIAGNOSIS — B96.20: ICD-10-CM

## 2023-08-24 DIAGNOSIS — I10: ICD-10-CM

## 2023-08-24 DIAGNOSIS — Z20.822: ICD-10-CM

## 2023-08-24 LAB
ALBUMIN SERPL-MCNC: 2.3 G/DL (ref 3.4–5)
ALBUMIN/GLOB SERPL: 0.5 {RATIO} (ref 1.2–2.2)
ALP SERPL-CCNC: 142 U/L (ref 46–116)
ALT SERPL-CCNC: 72 U/L (ref 12–78)
ANION GAP SERPL CALC-SCNC: 10.4 MMOL/L (ref 5–14)
APPEARANCE UR: (no result)
AST SERPL-CCNC: 57 U/L (ref 15–37)
BACTERIA URNS QL MICRO: (no result)
BASOPHILS # BLD AUTO: 0.03 K/UL (ref 0–0.1)
BASOPHILS NFR BLD AUTO: 0.2 % (ref 0.1–1.3)
BILIRUB SERPL-MCNC: 1.2 MG/DL (ref 0.2–1)
BILIRUB UR STRIP-MCNC: (no result) MG/DL
BUN SERPL-MCNC: 26 MG/DL (ref 7–18)
CALCIUM SERPL-MCNC: 8.7 MG/DL (ref 8.5–10.1)
CHLORIDE SERPL-SCNC: 102 MMOL/L (ref 100–108)
CO2 SERPL-SCNC: 29 MMOL/L (ref 21–32)
COLOR UR: YELLOW
CREAT CL 24H UR+SERPL-VRATE: 76.64 ML/MIN
CREAT SERPL-MCNC: 0.9 MG/DL (ref 0.8–1.3)
EOSINOPHIL # BLD AUTO: 0.03 K/UL (ref 0–0.4)
EOSINOPHIL NFR BLD AUTO: 0.2 % (ref 0–5.4)
EPI CELLS #/AREA URNS HPF: (no result) /[HPF]
GLOBULIN SER-MCNC: 4.3 G/DL (ref 2.3–3.5)
GLUCOSE SERPL-MCNC: 106 MG/DL (ref 74–106)
GLUCOSE UR STRIP-MCNC: NEGATIVE MG/DL
HCT VFR BLD AUTO: 36.6 % (ref 38.4–49.7)
HGB BLD-MCNC: 12 G/DL (ref 12.9–16.9)
IMM GRANULOCYTES # BLD: 0.04 K/UL (ref 0–0.23)
IMM GRANULOCYTES NFR BLD: 0.3 % (ref 0–0.7)
INR PPP: 5.8
KETONES UR STRIP-MCNC: 40 MG/DL
LYMPHOCYTES # BLD AUTO: 0.65 K/UL (ref 0.8–3.3)
LYMPHOCYTES NFR BLD AUTO: 4.7 % (ref 11.4–47.7)
MCH RBC QN AUTO: 31.5 PG (ref 31.6–35.5)
MCHC RBC AUTO-ENTMCNC: 32.8 G/DL (ref 31.6–35.5)
MCHC RBC AUTO-ENTMCNC: 96.1 FL (ref 81.4–99)
MONOCYTES # BLD AUTO: 1.34 K/UL (ref 0.2–0.9)
MONOCYTES NFR BLD AUTO: 9.6 % (ref 3.3–12.6)
MUCOUS THREADS URNS QL MICRO: (no result)
NEUTROPHILS # BLD AUTO: 11.8 K/UL (ref 1–7.6)
NEUTROPHILS NFR BLD AUTO: 85 % (ref 40–78.1)
NITRITE UR QL: NEGATIVE
PH UR STRIP: 6 [PH] (ref 5–8)
PLATELET # BLD AUTO: 298 K/UL (ref 130–375)
POTASSIUM SERPL-SCNC: 3.4 MMOL/L (ref 3.6–5.2)
PROT SERPL-MCNC: 6.6 G/DL (ref 6.4–8.2)
PROT UR STRIP-MCNC: 100 MG/DL
PROTHROMBIN TIME: 52.3 SEC (ref 9.2–10.6)
RBC # BLD AUTO: 3.81 M/UL (ref 4.14–5.76)
RBC UR QL: (no result)
SODIUM SERPL-SCNC: 138 MMOL/L (ref 140–148)
SP GR UR STRIP: 1.02 (ref 1.01–1.03)
UROBILINOGEN UR STRIP-ACNC: 1 EU/DL (ref 0.2–1)
WBC # BLD AUTO: 13.9 K/UL (ref 3.2–11)
WBC UR QL: (no result) (ref 0–5)

## 2023-08-24 PROCEDURE — 87086 URINE CULTURE/COLONY COUNT: CPT

## 2023-08-24 PROCEDURE — 36415 COLL VENOUS BLD VENIPUNCTURE: CPT

## 2023-08-24 PROCEDURE — 85027 COMPLETE CBC AUTOMATED: CPT

## 2023-08-24 PROCEDURE — U0002 COVID-19 LAB TEST NON-CDC: HCPCS

## 2023-08-24 PROCEDURE — 96366 THER/PROPH/DIAG IV INF ADDON: CPT

## 2023-08-24 PROCEDURE — 76705 ECHO EXAM OF ABDOMEN: CPT

## 2023-08-24 PROCEDURE — 96376 TX/PRO/DX INJ SAME DRUG ADON: CPT

## 2023-08-24 PROCEDURE — 81001 URINALYSIS AUTO W/SCOPE: CPT

## 2023-08-24 PROCEDURE — 83690 ASSAY OF LIPASE: CPT

## 2023-08-24 PROCEDURE — 87186 SC STD MICRODIL/AGAR DIL: CPT

## 2023-08-24 PROCEDURE — 80053 COMPREHEN METABOLIC PANEL: CPT

## 2023-08-24 PROCEDURE — 96365 THER/PROPH/DIAG IV INF INIT: CPT

## 2023-08-24 PROCEDURE — 96367 TX/PROPH/DG ADDL SEQ IV INF: CPT

## 2023-08-24 PROCEDURE — 96361 HYDRATE IV INFUSION ADD-ON: CPT

## 2023-08-24 PROCEDURE — 87088 URINE BACTERIA CULTURE: CPT

## 2023-08-24 PROCEDURE — 85610 PROTHROMBIN TIME: CPT

## 2023-08-24 PROCEDURE — 99222 1ST HOSP IP/OBS MODERATE 55: CPT

## 2023-08-24 PROCEDURE — 83605 ASSAY OF LACTIC ACID: CPT

## 2023-08-24 PROCEDURE — 85025 COMPLETE CBC W/AUTO DIFF WBC: CPT

## 2023-08-24 PROCEDURE — G0378 HOSPITAL OBSERVATION PER HR: HCPCS

## 2023-08-24 PROCEDURE — 74176 CT ABD & PELVIS W/O CONTRAST: CPT

## 2023-08-24 PROCEDURE — 99285 EMERGENCY DEPT VISIT HI MDM: CPT

## 2023-08-24 RX ADMIN — Medication SCH CAP: at 22:17

## 2023-08-25 LAB
ALBUMIN SERPL-MCNC: 2.1 G/DL (ref 3.4–5)
ALBUMIN/GLOB SERPL: 0.5 {RATIO} (ref 1.2–2.2)
ALP SERPL-CCNC: 125 U/L (ref 46–116)
ALT SERPL-CCNC: 63 U/L (ref 12–78)
ANION GAP SERPL CALC-SCNC: 10.5 MMOL/L (ref 5–14)
AST SERPL-CCNC: 56 U/L (ref 15–37)
BILIRUB SERPL-MCNC: 1.3 MG/DL (ref 0.2–1)
BUN SERPL-MCNC: 22 MG/DL (ref 7–18)
CALCIUM SERPL-MCNC: 8.5 MG/DL (ref 8.5–10.1)
CHLORIDE SERPL-SCNC: 104 MMOL/L (ref 100–108)
CO2 SERPL-SCNC: 27 MMOL/L (ref 21–32)
CREAT CL 24H UR+SERPL-VRATE: 76.64 ML/MIN
CREAT SERPL-MCNC: 0.9 MG/DL (ref 0.8–1.3)
GLOBULIN SER-MCNC: 4.1 G/DL (ref 2.3–3.5)
GLUCOSE SERPL-MCNC: 105 MG/DL (ref 74–106)
HCT VFR BLD AUTO: 34.4 % (ref 38.4–49.7)
HGB BLD-MCNC: 11.3 G/DL (ref 12.9–16.9)
INR PPP: 1.8
MCH RBC QN AUTO: 31.5 PG (ref 31.6–35.5)
MCHC RBC AUTO-ENTMCNC: 32.8 G/DL (ref 31.6–35.5)
MCHC RBC AUTO-ENTMCNC: 95.8 FL (ref 81.4–99)
PLATELET # BLD AUTO: 283 K/UL (ref 130–375)
POTASSIUM SERPL-SCNC: 3.6 MMOL/L (ref 3.6–5.2)
PROT SERPL-MCNC: 6.2 G/DL (ref 6.4–8.2)
PROTHROMBIN TIME: 17.8 SEC (ref 9.2–10.6)
RBC # BLD AUTO: 3.59 M/UL (ref 4.14–5.76)
SODIUM SERPL-SCNC: 141 MMOL/L (ref 140–148)
WBC # BLD AUTO: 12.4 K/UL (ref 3.2–11)

## 2023-08-25 RX ADMIN — TAZOBACTAM SODIUM AND PIPERACILLIN SODIUM SCH MLS/HR: 375; 3 INJECTION, SOLUTION INTRAVENOUS at 11:30

## 2023-08-25 RX ADMIN — TAZOBACTAM SODIUM AND PIPERACILLIN SODIUM SCH MLS/HR: 375; 3 INJECTION, SOLUTION INTRAVENOUS at 17:03

## 2023-08-25 RX ADMIN — Medication SCH CAP: at 10:22

## 2023-08-25 RX ADMIN — Medication SCH CAP: at 20:01

## 2023-08-25 RX ADMIN — TAZOBACTAM SODIUM AND PIPERACILLIN SODIUM SCH MLS/HR: 375; 3 INJECTION, SOLUTION INTRAVENOUS at 22:21

## 2023-08-26 LAB
ALBUMIN SERPL-MCNC: 2 G/DL (ref 3.4–5)
ALBUMIN/GLOB SERPL: 0.5 {RATIO} (ref 1.2–2.2)
ALP SERPL-CCNC: 119 U/L (ref 46–116)
ALT SERPL-CCNC: 74 U/L (ref 12–78)
ANION GAP SERPL CALC-SCNC: 12.2 MMOL/L (ref 5–14)
AST SERPL-CCNC: 88 U/L (ref 15–37)
BILIRUB SERPL-MCNC: 1 MG/DL (ref 0.2–1)
BUN SERPL-MCNC: 24 MG/DL (ref 7–18)
CALCIUM SERPL-MCNC: 8.6 MG/DL (ref 8.5–10.1)
CHLORIDE SERPL-SCNC: 106 MMOL/L (ref 100–108)
CO2 SERPL-SCNC: 27 MMOL/L (ref 21–32)
CREAT CL 24H UR+SERPL-VRATE: 76.64 ML/MIN
CREAT SERPL-MCNC: 0.9 MG/DL (ref 0.8–1.3)
GLOBULIN SER-MCNC: 4.1 G/DL (ref 2.3–3.5)
GLUCOSE SERPL-MCNC: 107 MG/DL (ref 74–106)
HCT VFR BLD AUTO: 34 % (ref 38.4–49.7)
HGB BLD-MCNC: 11 G/DL (ref 12.9–16.9)
INR PPP: 1.3
MCH RBC QN AUTO: 31.3 PG (ref 31.6–35.5)
MCHC RBC AUTO-ENTMCNC: 32.4 G/DL (ref 31.6–35.5)
MCHC RBC AUTO-ENTMCNC: 96.6 FL (ref 81.4–99)
PLATELET # BLD AUTO: 289 K/UL (ref 130–375)
POTASSIUM SERPL-SCNC: 3.2 MMOL/L (ref 3.6–5.2)
PROT SERPL-MCNC: 6.1 G/DL (ref 6.4–8.2)
PROTHROMBIN TIME: 12.7 SEC (ref 9.2–10.6)
RBC # BLD AUTO: 3.52 M/UL (ref 4.14–5.76)
SODIUM SERPL-SCNC: 142 MMOL/L (ref 140–148)
WBC # BLD AUTO: 10.2 K/UL (ref 3.2–11)

## 2023-08-26 RX ADMIN — Medication SCH CAP: at 08:27

## 2023-08-26 RX ADMIN — TAZOBACTAM SODIUM AND PIPERACILLIN SODIUM SCH MLS/HR: 375; 3 INJECTION, SOLUTION INTRAVENOUS at 16:55

## 2023-08-26 RX ADMIN — TAZOBACTAM SODIUM AND PIPERACILLIN SODIUM SCH MLS/HR: 375; 3 INJECTION, SOLUTION INTRAVENOUS at 23:22

## 2023-08-26 RX ADMIN — Medication SCH CAP: at 20:12

## 2023-08-26 RX ADMIN — TAZOBACTAM SODIUM AND PIPERACILLIN SODIUM SCH MLS/HR: 375; 3 INJECTION, SOLUTION INTRAVENOUS at 11:34

## 2023-08-26 RX ADMIN — TAZOBACTAM SODIUM AND PIPERACILLIN SODIUM SCH MLS/HR: 375; 3 INJECTION, SOLUTION INTRAVENOUS at 05:05

## 2023-08-27 LAB
ALBUMIN SERPL-MCNC: 1.9 G/DL (ref 3.4–5)
ALBUMIN/GLOB SERPL: 0.5 {RATIO} (ref 1.2–2.2)
ALP SERPL-CCNC: 109 U/L (ref 46–116)
ALT SERPL-CCNC: 85 U/L (ref 12–78)
ANION GAP SERPL CALC-SCNC: 12.3 MMOL/L (ref 5–14)
AST SERPL-CCNC: 107 U/L (ref 15–37)
BILIRUB SERPL-MCNC: 0.8 MG/DL (ref 0.2–1)
BUN SERPL-MCNC: 26 MG/DL (ref 7–18)
CALCIUM SERPL-MCNC: 8.3 MG/DL (ref 8.5–10.1)
CHLORIDE SERPL-SCNC: 107 MMOL/L (ref 100–108)
CO2 SERPL-SCNC: 27 MMOL/L (ref 21–32)
CREAT CL 24H UR+SERPL-VRATE: 76.64 ML/MIN
CREAT SERPL-MCNC: 0.9 MG/DL (ref 0.8–1.3)
GLOBULIN SER-MCNC: 4.1 G/DL (ref 2.3–3.5)
GLUCOSE SERPL-MCNC: 99 MG/DL (ref 74–106)
HCT VFR BLD AUTO: 33.2 % (ref 38.4–49.7)
HGB BLD-MCNC: 10.8 G/DL (ref 12.9–16.9)
INR PPP: 1.3
MCH RBC QN AUTO: 31.6 PG (ref 31.6–35.5)
MCHC RBC AUTO-ENTMCNC: 32.5 G/DL (ref 31.6–35.5)
MCHC RBC AUTO-ENTMCNC: 97.1 FL (ref 81.4–99)
PLATELET # BLD AUTO: 257 K/UL (ref 130–375)
POTASSIUM SERPL-SCNC: 3.3 MMOL/L (ref 3.6–5.2)
PROT SERPL-MCNC: 6 G/DL (ref 6.4–8.2)
PROTHROMBIN TIME: 12.9 SEC (ref 9.2–10.6)
RBC # BLD AUTO: 3.42 M/UL (ref 4.14–5.76)
SODIUM SERPL-SCNC: 143 MMOL/L (ref 140–148)
WBC # BLD AUTO: 8.2 K/UL (ref 3.2–11)

## 2023-08-27 PROCEDURE — 0FT44ZZ RESECTION OF GALLBLADDER, PERCUTANEOUS ENDOSCOPIC APPROACH: ICD-10-PCS | Performed by: SURGERY

## 2023-08-27 RX ADMIN — Medication SCH CAP: at 20:00

## 2023-08-27 RX ADMIN — TAZOBACTAM SODIUM AND PIPERACILLIN SODIUM SCH MLS/HR: 375; 3 INJECTION, SOLUTION INTRAVENOUS at 23:30

## 2023-08-27 RX ADMIN — Medication SCH CAP: at 11:50

## 2023-08-27 RX ADMIN — TAZOBACTAM SODIUM AND PIPERACILLIN SODIUM SCH MLS/HR: 375; 3 INJECTION, SOLUTION INTRAVENOUS at 11:47

## 2023-08-27 RX ADMIN — TAZOBACTAM SODIUM AND PIPERACILLIN SODIUM SCH MLS/HR: 375; 3 INJECTION, SOLUTION INTRAVENOUS at 17:05

## 2023-08-27 RX ADMIN — TAZOBACTAM SODIUM AND PIPERACILLIN SODIUM SCH MLS/HR: 375; 3 INJECTION, SOLUTION INTRAVENOUS at 05:13

## 2023-08-28 LAB
ALBUMIN SERPL-MCNC: 2 G/DL (ref 3.4–5)
ALBUMIN/GLOB SERPL: 0.5 {RATIO} (ref 1.2–2.2)
ALP SERPL-CCNC: 104 U/L (ref 46–116)
ALT SERPL-CCNC: 142 U/L (ref 12–78)
ANION GAP SERPL CALC-SCNC: 8 MMOL/L (ref 5–14)
AST SERPL-CCNC: 253 U/L (ref 15–37)
BASOPHILS # BLD AUTO: 0.01 K/UL (ref 0–0.1)
BASOPHILS NFR BLD AUTO: 0.1 % (ref 0.1–1.3)
BILIRUB SERPL-MCNC: 0.7 MG/DL (ref 0.2–1)
BUN SERPL-MCNC: 31 MG/DL (ref 7–18)
CALCIUM SERPL-MCNC: 8.4 MG/DL (ref 8.5–10.1)
CHLORIDE SERPL-SCNC: 106 MMOL/L (ref 100–108)
CO2 SERPL-SCNC: 27 MMOL/L (ref 21–32)
CREAT CL 24H UR+SERPL-VRATE: 68.97 ML/MIN
CREAT SERPL-MCNC: 1 MG/DL (ref 0.8–1.3)
EOSINOPHIL # BLD AUTO: 0 K/UL (ref 0–0.4)
EOSINOPHIL NFR BLD AUTO: 0 % (ref 0–5.4)
GLOBULIN SER-MCNC: 4.1 G/DL (ref 2.3–3.5)
GLUCOSE SERPL-MCNC: 147 MG/DL (ref 74–106)
HCT VFR BLD AUTO: 33.9 % (ref 38.4–49.7)
HGB BLD-MCNC: 10.9 G/DL (ref 12.9–16.9)
IMM GRANULOCYTES # BLD: 0.13 K/UL (ref 0–0.23)
IMM GRANULOCYTES NFR BLD: 1.2 % (ref 0–0.7)
INR PPP: 1.7
LYMPHOCYTES # BLD AUTO: 0.55 K/UL (ref 0.8–3.3)
LYMPHOCYTES NFR BLD AUTO: 5 % (ref 11.4–47.7)
MCH RBC QN AUTO: 31.2 PG (ref 31.6–35.5)
MCHC RBC AUTO-ENTMCNC: 32.2 G/DL (ref 31.6–35.5)
MCHC RBC AUTO-ENTMCNC: 97.1 FL (ref 81.4–99)
MONOCYTES # BLD AUTO: 0.59 K/UL (ref 0.2–0.9)
MONOCYTES NFR BLD AUTO: 5.3 % (ref 3.3–12.6)
NEUTROPHILS # BLD AUTO: 9.83 K/UL (ref 1–7.6)
NEUTROPHILS NFR BLD AUTO: 88.4 % (ref 40–78.1)
PLATELET # BLD AUTO: 290 K/UL (ref 130–375)
POTASSIUM SERPL-SCNC: 4.1 MMOL/L (ref 3.6–5.2)
PROT SERPL-MCNC: 6.1 G/DL (ref 6.4–8.2)
PROTHROMBIN TIME: 17 SEC (ref 9.2–10.6)
RBC # BLD AUTO: 3.49 M/UL (ref 4.14–5.76)
SODIUM SERPL-SCNC: 141 MMOL/L (ref 140–148)
WBC # BLD AUTO: 11.1 K/UL (ref 3.2–11)

## 2023-08-28 RX ADMIN — Medication SCH: at 09:00

## 2023-08-28 RX ADMIN — TAZOBACTAM SODIUM AND PIPERACILLIN SODIUM SCH MLS/HR: 375; 3 INJECTION, SOLUTION INTRAVENOUS at 05:07

## 2023-08-28 RX ADMIN — AMOXICILLIN AND CLAVULANATE POTASSIUM SCH TAB: 875; 125 TABLET, FILM COATED ORAL at 21:16

## 2023-08-28 RX ADMIN — Medication SCH CAP: at 08:58

## 2023-08-28 RX ADMIN — AMOXICILLIN AND CLAVULANATE POTASSIUM SCH TAB: 875; 125 TABLET, FILM COATED ORAL at 08:59

## 2023-08-28 RX ADMIN — Medication SCH CAP: at 20:08

## 2023-08-29 LAB
ALBUMIN SERPL-MCNC: 1.9 G/DL (ref 3.4–5)
ALBUMIN/GLOB SERPL: 0.5 {RATIO} (ref 1.2–2.2)
ALP SERPL-CCNC: 102 U/L (ref 46–116)
ALT SERPL-CCNC: 130 U/L (ref 12–78)
ANION GAP SERPL CALC-SCNC: 8.3 MMOL/L (ref 5–14)
AST SERPL-CCNC: 172 U/L (ref 15–37)
BASOPHILS # BLD AUTO: 0.04 K/UL (ref 0–0.1)
BASOPHILS NFR BLD AUTO: 0.4 % (ref 0.1–1.3)
BILIRUB SERPL-MCNC: 0.5 MG/DL (ref 0.2–1)
BUN SERPL-MCNC: 33 MG/DL (ref 7–18)
CALCIUM SERPL-MCNC: 8.2 MG/DL (ref 8.5–10.1)
CHLORIDE SERPL-SCNC: 107 MMOL/L (ref 100–108)
CO2 SERPL-SCNC: 29 MMOL/L (ref 21–32)
CREAT CL 24H UR+SERPL-VRATE: 76.64 ML/MIN
CREAT SERPL-MCNC: 0.9 MG/DL (ref 0.8–1.3)
EOSINOPHIL # BLD AUTO: 0.38 K/UL (ref 0–0.4)
EOSINOPHIL NFR BLD AUTO: 3.8 % (ref 0–5.4)
GLOBULIN SER-MCNC: 4.1 G/DL (ref 2.3–3.5)
GLUCOSE SERPL-MCNC: 104 MG/DL (ref 74–106)
HCT VFR BLD AUTO: 32.8 % (ref 38.4–49.7)
HGB BLD-MCNC: 10.6 G/DL (ref 12.9–16.9)
IMM GRANULOCYTES # BLD: 0.12 K/UL (ref 0–0.23)
IMM GRANULOCYTES NFR BLD: 1.2 % (ref 0–0.7)
INR PPP: 2.5
LYMPHOCYTES # BLD AUTO: 1.41 K/UL (ref 0.8–3.3)
LYMPHOCYTES NFR BLD AUTO: 14.3 % (ref 11.4–47.7)
MCH RBC QN AUTO: 31.4 PG (ref 31.6–35.5)
MCHC RBC AUTO-ENTMCNC: 32.3 G/DL (ref 31.6–35.5)
MCHC RBC AUTO-ENTMCNC: 97 FL (ref 81.4–99)
MONOCYTES # BLD AUTO: 0.71 K/UL (ref 0.2–0.9)
MONOCYTES NFR BLD AUTO: 7.2 % (ref 3.3–12.6)
NEUTROPHILS # BLD AUTO: 7.23 K/UL (ref 1–7.6)
NEUTROPHILS NFR BLD AUTO: 73.1 % (ref 40–78.1)
PLATELET # BLD AUTO: 288 K/UL (ref 130–375)
POTASSIUM SERPL-SCNC: 3.3 MMOL/L (ref 3.6–5.2)
PROT SERPL-MCNC: 6 G/DL (ref 6.4–8.2)
PROTHROMBIN TIME: 23.9 SEC (ref 9.2–10.6)
RBC # BLD AUTO: 3.38 M/UL (ref 4.14–5.76)
SODIUM SERPL-SCNC: 141 MMOL/L (ref 140–148)
WBC # BLD AUTO: 9.9 K/UL (ref 3.2–11)

## 2023-08-29 RX ADMIN — AMOXICILLIN AND CLAVULANATE POTASSIUM SCH TAB: 875; 125 TABLET, FILM COATED ORAL at 09:46

## 2023-08-29 RX ADMIN — AMOXICILLIN AND CLAVULANATE POTASSIUM SCH TAB: 875; 125 TABLET, FILM COATED ORAL at 23:44

## 2023-08-29 RX ADMIN — Medication SCH CAP: at 08:02

## 2023-08-29 RX ADMIN — Medication SCH CAP: at 20:04

## 2023-08-29 RX ADMIN — Medication SCH: at 09:46

## 2023-08-30 LAB
INR PPP: 2.4
PROTHROMBIN TIME: 23.2 SEC (ref 9.2–10.6)

## 2023-08-30 RX ADMIN — Medication SCH: at 08:08

## 2023-08-30 RX ADMIN — AMOXICILLIN AND CLAVULANATE POTASSIUM SCH TAB: 875; 125 TABLET, FILM COATED ORAL at 09:25

## 2023-08-30 RX ADMIN — Medication SCH CAP: at 08:07

## 2025-05-12 ENCOUNTER — HOSPITAL ENCOUNTER (INPATIENT)
Dept: HOSPITAL 11 - JP.ED | Age: 89
LOS: 4 days | Discharge: HOME | DRG: 566 | End: 2025-05-16
Attending: INTERNAL MEDICINE | Admitting: INTERNAL MEDICINE
Payer: MEDICARE

## 2025-05-12 DIAGNOSIS — S80.812A: ICD-10-CM

## 2025-05-12 DIAGNOSIS — Z86.16: ICD-10-CM

## 2025-05-12 DIAGNOSIS — H91.90: ICD-10-CM

## 2025-05-12 DIAGNOSIS — I10: ICD-10-CM

## 2025-05-12 DIAGNOSIS — E03.9: ICD-10-CM

## 2025-05-12 DIAGNOSIS — E78.5: ICD-10-CM

## 2025-05-12 DIAGNOSIS — M54.9: ICD-10-CM

## 2025-05-12 DIAGNOSIS — Z98.890: ICD-10-CM
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
## 2025-05-12 DIAGNOSIS — Z79.01: ICD-10-CM

## 2025-05-12 DIAGNOSIS — N40.0: ICD-10-CM

## 2025-05-12 DIAGNOSIS — G89.29: ICD-10-CM

## 2025-05-12 DIAGNOSIS — M19.90: ICD-10-CM

## 2025-05-12 DIAGNOSIS — Z98.49: ICD-10-CM

## 2025-05-12 DIAGNOSIS — G47.30: ICD-10-CM

## 2025-05-12 DIAGNOSIS — Z79.82: ICD-10-CM

## 2025-05-12 DIAGNOSIS — S00.83XA: ICD-10-CM

## 2025-05-12 DIAGNOSIS — S60.812A: ICD-10-CM

## 2025-05-12 DIAGNOSIS — Z85.820: ICD-10-CM

## 2025-05-12 DIAGNOSIS — T79.6XXA: Primary | ICD-10-CM

## 2025-05-12 DIAGNOSIS — Z86.73: ICD-10-CM

## 2025-05-12 DIAGNOSIS — Z90.49: ICD-10-CM

## 2025-05-12 DIAGNOSIS — Z96.659: ICD-10-CM

## 2025-05-12 DIAGNOSIS — Z79.899: ICD-10-CM

## 2025-05-12 LAB
ALBUMIN SERPL-MCNC: 3.6 G/DL (ref 3.4–5)
ALBUMIN/GLOB SERPL: 1.1 {RATIO} (ref 1.2–2.2)
ALP SERPL-CCNC: 88 U/L (ref 46–116)
ALT SERPL-CCNC: 32 U/L (ref 12–78)
ANION GAP SERPL CALC-SCNC: 13.1 MMOL/L (ref 5–14)
AST SERPL-CCNC: 66 U/L (ref 15–37)
BASOPHILS # BLD AUTO: 0.04 K/UL (ref 0–0.1)
BASOPHILS NFR BLD AUTO: 0.4 % (ref 0.1–1.3)
BILIRUB SERPL-MCNC: 1.8 MG/DL (ref 0.2–1)
BUN SERPL-MCNC: 29 MG/DL (ref 7–18)
CALCIUM SERPL-MCNC: 9.1 MG/DL (ref 8.5–10.1)
CHLORIDE SERPL-SCNC: 105 MMOL/L (ref 100–108)
CK SERPL-CCNC: 3123 U/L (ref 39–308)
CO2 SERPL-SCNC: 25 MMOL/L (ref 21–32)
CREAT CL 24H UR+SERPL-VRATE: 48.34 ML/MIN
CREAT SERPL-MCNC: 1.4 MG/DL (ref 0.8–1.3)
GLOBULIN SER-MCNC: 3.2 G/DL (ref 2.3–3.5)
GLUCOSE SERPL-MCNC: 138 MG/DL (ref 74–106)
HGB BLD-MCNC: 14.8 G/DL (ref 12.9–16.9)
IMM GRANULOCYTES # BLD: 0.03 K/UL (ref 0–0.23)
IMM GRANULOCYTES NFR BLD: 0.3 % (ref 0–0.7)
LYMPHOCYTES # BLD AUTO: 0.46 K/UL (ref 0.8–3.3)
LYMPHOCYTES NFR BLD AUTO: 4.1 % (ref 11.4–47.7)
MCH RBC QN AUTO: 33.3 PG (ref 31.6–35.5)
MCHC RBC AUTO-ENTMCNC: 33.6 G/DL (ref 31.6–35.5)
MCHC RBC AUTO-ENTMCNC: 98.9 FL (ref 81.4–99)
MONOCYTES # BLD AUTO: 0.86 K/UL (ref 0.2–0.9)
MONOCYTES NFR BLD AUTO: 7.7 % (ref 3.3–12.6)
NEUTROPHILS # BLD AUTO: 9.75 K/UL (ref 1–7.6)
NEUTROPHILS NFR BLD AUTO: 87.5 % (ref 40–78.1)
PLATELET # BLD AUTO: 140 K/UL (ref 130–375)
POTASSIUM SERPL-SCNC: 4.4 MMOL/L (ref 3.6–5.2)
PROT SERPL-MCNC: 6.8 G/DL (ref 6.4–8.2)
RBC # BLD AUTO: 4.45 M/UL (ref 4.14–5.76)
SODIUM SERPL-SCNC: 143 MMOL/L (ref 140–148)
WBC # BLD AUTO: 11.1 K/UL (ref 3.2–11)

## 2025-05-12 RX ADMIN — Medication ONE ML: at 23:24

## 2025-05-12 RX ADMIN — IOPAMIDOL ONE ML: 612 INJECTION, SOLUTION INTRAVENOUS at 23:24

## 2025-05-13 LAB
APPEARANCE UR: (no result)
BACTERIA URNS QL MICRO: (no result)
BILIRUB UR STRIP-MCNC: NEGATIVE MG/DL
COLOR UR: YELLOW
EPI CELLS #/AREA URNS HPF: (no result) /[HPF]
GLUCOSE UR STRIP-MCNC: NEGATIVE MG/DL
INR PPP: 3.4
KETONES UR STRIP-MCNC: 15 MG/DL
MUCOUS THREADS URNS QL MICRO: (no result)
NITRITE UR QL: POSITIVE
PROT UR STRIP-MCNC: 100 MG/DL
PROTHROMBIN TIME: 33.3 SEC (ref 9.2–10.6)
RBC # URNS HPF: (no result) /ML (ref 0–5)
RBC UR QL: (no result)
SP GR UR STRIP: 1.02 (ref 1.01–1.03)
UROBILINOGEN UR STRIP-ACNC: 0.2 EU/DL (ref 0.2–1)

## 2025-05-13 RX ADMIN — ALFUZOSIN HCL SCH MG: 10 TABLET, EXTENDED RELEASE ORAL at 08:02

## 2025-05-13 RX ADMIN — FENTANYL CITRATE ONE: 50 INJECTION, SOLUTION INTRAMUSCULAR; INTRAVENOUS at 09:41

## 2025-05-14 LAB
ANION GAP SERPL CALC-SCNC: 11.9 MMOL/L (ref 5–14)
BASOPHILS # BLD AUTO: 0.03 K/UL (ref 0–0.1)
BASOPHILS NFR BLD AUTO: 0.5 % (ref 0.1–1.3)
CALCIUM SERPL-MCNC: 8.1 MG/DL (ref 8.5–10.1)
CREAT CL 24H UR+SERPL-VRATE: 84.59 ML/MIN
CREAT SERPL-MCNC: 0.8 MG/DL (ref 0.8–1.3)
EOSINOPHIL # BLD AUTO: 0.19 K/UL (ref 0–0.4)
EOSINOPHIL NFR BLD AUTO: 3.2 % (ref 0–5.4)
HGB BLD-MCNC: 12.3 G/DL (ref 12.9–16.9)
IMM GRANULOCYTES # BLD: 0.02 K/UL (ref 0–0.23)
IMM GRANULOCYTES NFR BLD: 0.3 % (ref 0–0.7)
INR PPP: 2.9
LYMPHOCYTES # BLD AUTO: 0.72 K/UL (ref 0.8–3.3)
LYMPHOCYTES NFR BLD AUTO: 12.3 % (ref 11.4–47.7)
MCH RBC QN AUTO: 33.6 PG (ref 31.6–35.5)
MCHC RBC AUTO-ENTMCNC: 34.2 G/DL (ref 31.6–35.5)
MCHC RBC AUTO-ENTMCNC: 98.4 FL (ref 81.4–99)
MONOCYTES # BLD AUTO: 0.73 K/UL (ref 0.2–0.9)
MONOCYTES NFR BLD AUTO: 12.4 % (ref 3.3–12.6)
NEUTROPHILS # BLD AUTO: 4.18 K/UL (ref 1–7.6)
NEUTROPHILS NFR BLD AUTO: 71.3 % (ref 40–78.1)
PLATELET # BLD AUTO: 114 K/UL (ref 130–375)
POTASSIUM SERPL-SCNC: 3.9 MMOL/L (ref 3.6–5.2)
PROTHROMBIN TIME: 28.6 SEC (ref 9.2–10.6)
RBC # BLD AUTO: 3.66 M/UL (ref 4.14–5.76)
WBC # BLD AUTO: 5.9 K/UL (ref 3.2–11)

## 2025-05-15 LAB
INR PPP: 2.1
PROTHROMBIN TIME: 20.7 SEC (ref 9.2–10.6)

## 2025-05-15 RX ADMIN — SILVER SULFADIAZINE SCH: 10 CREAM TOPICAL at 12:06

## 2025-05-16 VITALS — HEART RATE: 92 BPM | DIASTOLIC BLOOD PRESSURE: 77 MMHG | SYSTOLIC BLOOD PRESSURE: 102 MMHG

## 2025-06-13 ENCOUNTER — HOSPITAL ENCOUNTER (EMERGENCY)
Dept: HOSPITAL 11 - JP.ED | Age: 89
Discharge: HOME | End: 2025-06-13
Payer: MEDICARE

## 2025-06-13 DIAGNOSIS — Z86.16: ICD-10-CM

## 2025-06-13 DIAGNOSIS — Z79.899: ICD-10-CM

## 2025-06-13 DIAGNOSIS — I48.91: Primary | ICD-10-CM

## 2025-06-13 DIAGNOSIS — E03.9: ICD-10-CM

## 2025-06-13 DIAGNOSIS — Z79.890: ICD-10-CM

## 2025-06-13 DIAGNOSIS — Z79.82: ICD-10-CM

## 2025-06-13 DIAGNOSIS — I10: ICD-10-CM

## 2025-06-13 LAB
ALBUMIN SERPL-MCNC: 2.9 G/DL (ref 3.4–5)
ALBUMIN/GLOB SERPL: 0.9 {RATIO} (ref 1.2–2.2)
ALP SERPL-CCNC: 87 U/L (ref 46–116)
ALT SERPL-CCNC: 26 U/L (ref 12–78)
ANION GAP SERPL CALC-SCNC: 9.1 MMOL/L (ref 5–14)
ANNOTATION COMMENT IMP: (no result)
AST SERPL-CCNC: 22 U/L (ref 15–37)
BASOPHILS # BLD AUTO: 0.04 K/UL (ref 0–0.1)
BASOPHILS NFR BLD AUTO: 0.7 % (ref 0.1–1.3)
BILIRUB SERPL-MCNC: 1.1 MG/DL (ref 0.2–1)
BNP SERPL-MCNC: 1185 PG/ML (ref 5–450)
BUN SERPL-MCNC: 22 MG/DL (ref 7–18)
BUN/CREAT SERPL: (no result)
CALCIUM SERPL-MCNC: 8.9 MG/DL (ref 8.5–10.1)
CHLORIDE SERPL-SCNC: 106 MMOL/L (ref 100–108)
CO2 SERPL-SCNC: 28 MMOL/L (ref 21–32)
CREAT CL 24H UR+SERPL-VRATE: 67.67 ML/MIN
CREAT SERPL-MCNC: 1 MG/DL (ref 0.8–1.3)
EOSINOPHIL # BLD AUTO: 0.12 K/UL (ref 0–0.4)
EOSINOPHIL NFR BLD AUTO: 2.2 % (ref 0–5.4)
GLOBULIN SER-MCNC: 3.4 G/DL (ref 2.3–3.5)
GLUCOSE SERPL-MCNC: 97 MG/DL (ref 74–106)
HCT VFR BLD AUTO: 37.2 % (ref 38.4–49.7)
HGB BLD-MCNC: 12.5 G/DL (ref 12.9–16.9)
IMM GRANULOCYTES # BLD: 0.04 K/UL (ref 0–0.23)
IMM GRANULOCYTES NFR BLD: 0.7 % (ref 0–0.7)
LYMPHOCYTES # BLD AUTO: 0.88 K/UL (ref 0.8–3.3)
LYMPHOCYTES NFR BLD AUTO: 16.4 % (ref 11.4–47.7)
MCH RBC QN AUTO: 33.3 PG (ref 31.6–35.5)
MCHC RBC AUTO-ENTMCNC: 33.6 G/DL (ref 31.6–35.5)
MCHC RBC AUTO-ENTMCNC: 99.2 FL (ref 81.4–99)
MONOCYTES # BLD AUTO: 0.53 K/UL (ref 0.2–0.9)
MONOCYTES NFR BLD AUTO: 9.9 % (ref 3.3–12.6)
NEUTROPHILS # BLD AUTO: 3.75 K/UL (ref 1–7.6)
NEUTROPHILS NFR BLD AUTO: 70.1 % (ref 40–78.1)
PLATELET # BLD AUTO: 156 K/UL (ref 130–375)
POTASSIUM SERPL-SCNC: 4.1 MMOL/L (ref 3.6–5.2)
PROT SERPL-MCNC: 6.3 G/DL (ref 6.4–8.2)
RBC # BLD AUTO: 3.75 M/UL (ref 4.14–5.76)
SODIUM SERPL-SCNC: 139 MMOL/L (ref 140–148)
TROPONIN I SERPL HS-MCNC: 59.9 PG/ML (ref ?–60.3)
WBC # BLD AUTO: 5.4 K/UL (ref 3.2–11)